# Patient Record
Sex: FEMALE | Race: WHITE | NOT HISPANIC OR LATINO | Employment: FULL TIME | URBAN - NONMETROPOLITAN AREA
[De-identification: names, ages, dates, MRNs, and addresses within clinical notes are randomized per-mention and may not be internally consistent; named-entity substitution may affect disease eponyms.]

---

## 2018-01-25 ENCOUNTER — HISTORICAL (OUTPATIENT)
Dept: ADMINISTRATIVE | Facility: HOSPITAL | Age: 33
End: 2018-01-25

## 2018-01-29 ENCOUNTER — HISTORICAL (OUTPATIENT)
Dept: INFUSION THERAPY | Facility: HOSPITAL | Age: 33
End: 2018-01-29

## 2018-02-26 ENCOUNTER — HISTORICAL (OUTPATIENT)
Dept: RADIOLOGY | Facility: HOSPITAL | Age: 33
End: 2018-02-26

## 2022-01-27 LAB
BASOPHILS # BLD AUTO: 0.07 10*3/UL (ref 0.01–0.08)
BASOPHILS NFR BLD AUTO: 0.8 % (ref 0.1–1.2)
EOSINOPHIL # BLD AUTO: 0.23 10*3/UL (ref 0.04–0.36)
EOSINOPHIL NFR BLD AUTO: 2.6 % (ref 0.7–7)
ERYTHROCYTE [DISTWIDTH] IN BLOOD BY AUTOMATED COUNT: 23.8 % (ref 11–14.5)
EST. AVERAGE GLUCOSE BLD GHB EST-MCNC: 60 MG/DL (ref 70–115)
HBA1C MFR BLD: <4 % (ref 4–6)
HCT VFR BLD AUTO: 27.6 % (ref 36–48)
HGB BLD-MCNC: 9.6 G/DL (ref 11.8–16)
IMM GRANULOCYTES # BLD AUTO: 0.1 10*3/UL (ref 0–0.03)
IMM GRANULOCYTES NFR BLD AUTO: 1.1 % (ref 0–0.5)
IRON SERPL-MCNC: 70 UG/DL (ref 37–170)
LYMPHOCYTES # BLD AUTO: 1.98 10*3/UL (ref 1.16–3.74)
LYMPHOCYTES NFR BLD AUTO: 22.4 % (ref 20–55)
MANUAL DIFF? (OHS): NORMAL
MCH RBC QN AUTO: 31.3 PG (ref 27–34)
MCHC RBC AUTO-ENTMCNC: 34.8 G/DL (ref 31–37)
MCV RBC AUTO: 89.9 FL (ref 79–99)
MONOCYTES # BLD AUTO: 0.53 10*3/UL (ref 0.24–0.36)
MONOCYTES NFR BLD AUTO: 6 % (ref 4.7–12.5)
NEUTROPHILS # BLD AUTO: 5.94 10*3/UL (ref 1.56–6.13)
NEUTROPHILS NFR BLD AUTO: 67.1 % (ref 37–73)
PLATELET # BLD AUTO: 203 10*3/UL (ref 140–371)
PMV BLD AUTO: 10.4 FL (ref 9.4–12.4)
RBC # BLD AUTO: 3.07 10*6/UL (ref 4–5.1)
WBC # SPEC AUTO: 8.9 10*3/UL (ref 4–11.5)

## 2022-04-07 ENCOUNTER — HISTORICAL (OUTPATIENT)
Dept: ADMINISTRATIVE | Facility: HOSPITAL | Age: 37
End: 2022-04-07

## 2022-04-24 VITALS
WEIGHT: 194.44 LBS | OXYGEN SATURATION: 99 % | BODY MASS INDEX: 34.45 KG/M2 | DIASTOLIC BLOOD PRESSURE: 76 MMHG | SYSTOLIC BLOOD PRESSURE: 128 MMHG | HEIGHT: 63 IN

## 2022-05-14 ENCOUNTER — HISTORICAL (OUTPATIENT)
Dept: ADMINISTRATIVE | Facility: HOSPITAL | Age: 37
End: 2022-05-14

## 2022-09-09 ENCOUNTER — HOSPITAL ENCOUNTER (EMERGENCY)
Facility: HOSPITAL | Age: 37
Discharge: HOME | End: 2022-09-09
Attending: EMERGENCY MEDICINE
Payer: MEDICAID

## 2022-09-09 VITALS
HEART RATE: 96 BPM | DIASTOLIC BLOOD PRESSURE: 74 MMHG | BODY MASS INDEX: 31.89 KG/M2 | SYSTOLIC BLOOD PRESSURE: 127 MMHG | HEIGHT: 63 IN | RESPIRATION RATE: 19 BRPM | TEMPERATURE: 98 F | WEIGHT: 180 LBS | OXYGEN SATURATION: 98 %

## 2022-09-09 DIAGNOSIS — R07.9 CHEST PAIN: ICD-10-CM

## 2022-09-09 DIAGNOSIS — R07.81 CHEST PAIN, PLEURITIC: Primary | ICD-10-CM

## 2022-09-09 LAB
ALBUMIN SERPL-MCNC: 4.6 GM/DL (ref 3.5–5)
ALBUMIN/GLOB SERPL: 1.7 RATIO (ref 1.1–2)
ALP SERPL-CCNC: 58 UNIT/L (ref 40–150)
ALT SERPL-CCNC: 39 UNIT/L (ref 0–55)
AST SERPL-CCNC: 23 UNIT/L (ref 5–34)
BASOPHILS # BLD AUTO: 0.06 X10(3)/MCL (ref 0–0.2)
BASOPHILS NFR BLD AUTO: 0.6 %
BILIRUBIN DIRECT+TOT PNL SERPL-MCNC: 3.5 MG/DL
BUN SERPL-MCNC: 12 MG/DL (ref 7–18.7)
CALCIUM SERPL-MCNC: 10.1 MG/DL (ref 8.4–10.2)
CHLORIDE SERPL-SCNC: 103 MMOL/L (ref 98–107)
CO2 SERPL-SCNC: 20 MMOL/L (ref 22–29)
CREAT SERPL-MCNC: 0.53 MG/DL (ref 0.55–1.02)
D DIMER PPP IA.FEU-MCNC: <0.27 UG/ML FEU (ref 0–0.5)
EOSINOPHIL # BLD AUTO: 0.22 X10(3)/MCL (ref 0–0.9)
EOSINOPHIL NFR BLD AUTO: 2.1 %
ERYTHROCYTE [DISTWIDTH] IN BLOOD BY AUTOMATED COUNT: 23 % (ref 11.5–17)
GFR SERPLBLD CREATININE-BSD FMLA CKD-EPI: >60 MLS/MIN/1.73/M2
GLOBULIN SER-MCNC: 2.7 GM/DL (ref 2.4–3.5)
GLUCOSE SERPL-MCNC: 172 MG/DL (ref 74–100)
HCT VFR BLD AUTO: 26.5 % (ref 37–47)
HGB BLD-MCNC: 9 GM/DL (ref 12–16)
IMM GRANULOCYTES # BLD AUTO: 0.12 X10(3)/MCL (ref 0–0.04)
IMM GRANULOCYTES NFR BLD AUTO: 1.1 %
LYMPHOCYTES # BLD AUTO: 2.18 X10(3)/MCL (ref 0.6–4.6)
LYMPHOCYTES NFR BLD AUTO: 20.4 %
MCH RBC QN AUTO: 32 PG (ref 27–31)
MCHC RBC AUTO-ENTMCNC: 34 MG/DL (ref 33–36)
MCV RBC AUTO: 94.3 FL (ref 80–94)
MONOCYTES # BLD AUTO: 0.8 X10(3)/MCL (ref 0.1–1.3)
MONOCYTES NFR BLD AUTO: 7.5 %
NEUTROPHILS # BLD AUTO: 7.3 X10(3)/MCL (ref 2.1–9.2)
NEUTROPHILS NFR BLD AUTO: 68.3 %
PLATELET # BLD AUTO: 186 X10(3)/MCL (ref 130–400)
PMV BLD AUTO: 10.1 FL (ref 7.4–10.4)
POTASSIUM SERPL-SCNC: 4 MMOL/L (ref 3.5–5.1)
PROT SERPL-MCNC: 7.3 GM/DL (ref 6.4–8.3)
RBC # BLD AUTO: 2.81 X10(6)/MCL (ref 4.2–5.4)
SODIUM SERPL-SCNC: 138 MMOL/L (ref 136–145)
TROPONIN I SERPL-MCNC: 0.01 NG/ML (ref 0–0.04)
TROPONIN I SERPL-MCNC: <0.01 NG/ML (ref 0–0.04)
WBC # SPEC AUTO: 10.7 X10(3)/MCL (ref 4.5–11.5)

## 2022-09-09 PROCEDURE — 80053 COMPREHEN METABOLIC PANEL: CPT | Performed by: EMERGENCY MEDICINE

## 2022-09-09 PROCEDURE — 85379 FIBRIN DEGRADATION QUANT: CPT | Performed by: EMERGENCY MEDICINE

## 2022-09-09 PROCEDURE — 85025 COMPLETE CBC W/AUTO DIFF WBC: CPT | Performed by: EMERGENCY MEDICINE

## 2022-09-09 PROCEDURE — 36415 COLL VENOUS BLD VENIPUNCTURE: CPT | Performed by: EMERGENCY MEDICINE

## 2022-09-09 PROCEDURE — 63600175 PHARM REV CODE 636 W HCPCS: Performed by: EMERGENCY MEDICINE

## 2022-09-09 PROCEDURE — 84484 ASSAY OF TROPONIN QUANT: CPT | Performed by: EMERGENCY MEDICINE

## 2022-09-09 PROCEDURE — 93005 ELECTROCARDIOGRAM TRACING: CPT

## 2022-09-09 PROCEDURE — 99285 EMERGENCY DEPT VISIT HI MDM: CPT | Mod: 25

## 2022-09-09 PROCEDURE — 96374 THER/PROPH/DIAG INJ IV PUSH: CPT

## 2022-09-09 PROCEDURE — 96375 TX/PRO/DX INJ NEW DRUG ADDON: CPT

## 2022-09-09 RX ORDER — LORAZEPAM 2 MG/ML
1 INJECTION INTRAMUSCULAR
Status: COMPLETED | OUTPATIENT
Start: 2022-09-09 | End: 2022-09-09

## 2022-09-09 RX ORDER — KETOROLAC TROMETHAMINE 30 MG/ML
30 INJECTION, SOLUTION INTRAMUSCULAR; INTRAVENOUS
Status: COMPLETED | OUTPATIENT
Start: 2022-09-09 | End: 2022-09-09

## 2022-09-09 RX ADMIN — LORAZEPAM 1 MG: 2 INJECTION INTRAMUSCULAR; INTRAVENOUS at 10:09

## 2022-09-09 RX ADMIN — KETOROLAC TROMETHAMINE 30 MG: 30 INJECTION, SOLUTION INTRAMUSCULAR at 10:09

## 2022-09-09 NOTE — DISCHARGE INSTRUCTIONS
Your workup today shows no concerning findings other than hemoglobin of 9.0.  Her last on file was 9.6.  Follow-up with family doctor next week to let them know your in the emergency department.    In regards to your workup your heart markers were negative for any heart attack and the test to evaluate you for any blood clots was also negative.    Please take 400-600 mg ibuprofen every 6-8 hours for the next 5 days to help with your symptoms.    Any worsening of symptoms or concerns please seek medical re-evaluation promptly

## 2022-09-09 NOTE — ED PROVIDER NOTES
Encounter Date: 2022       History     Chief Complaint   Patient presents with    Chest Pain     C/o left sided chest/breast pain that started last night and is worse this morning     CP that started last night. Sharp in nature. No recent fevers or illnesses. No N/V/D. No hx of MI or stroke.    Review of patient's allergies indicates:  No Known Allergies  Past Medical History:   Diagnosis Date    Anxiety disorder, unspecified     Depression     Neuropathy      Past Surgical History:   Procedure Laterality Date     SECTION      CHOLECYSTECTOMY       History reviewed. No pertinent family history.  Social History     Tobacco Use    Smoking status: Former     Types: Cigarettes    Smokeless tobacco: Never   Substance Use Topics    Alcohol use: Yes     Comment: occasion    Drug use: Never     Review of Systems   Constitutional:  Negative for fever.   HENT:  Negative for sore throat.    Respiratory:  Positive for chest tightness. Negative for shortness of breath.    Cardiovascular:  Negative for chest pain.   Gastrointestinal:  Negative for nausea.   Genitourinary:  Negative for dysuria.   Musculoskeletal:  Negative for back pain.   Skin:  Negative for rash.   Neurological:  Negative for weakness.   Hematological:  Does not bruise/bleed easily.     Physical Exam     Initial Vitals [22 0901]   BP Pulse Resp Temp SpO2   (!) 160/95 100 (!) 24 98.2 °F (36.8 °C) 100 %      MAP       --         Physical Exam    Nursing note reviewed.  Constitutional: She appears well-developed and well-nourished. No distress.   HENT:   Head: Normocephalic and atraumatic.   Eyes: EOM are normal. Pupils are equal, round, and reactive to light.   Neck:   Normal range of motion.  Cardiovascular:  Normal rate and regular rhythm.           Pulmonary/Chest: No stridor. No respiratory distress. She has no wheezes.   Abdominal: Abdomen is soft. Bowel sounds are normal. She exhibits no distension.   Musculoskeletal:         General:  Normal range of motion.      Cervical back: Normal range of motion.     Psychiatric: She has a normal mood and affect.       ED Course   Procedures  Labs Reviewed   COMPREHENSIVE METABOLIC PANEL - Abnormal; Notable for the following components:       Result Value    Carbon Dioxide 20 (*)     Glucose Level 172 (*)     Creatinine 0.53 (*)     Bilirubin Total 3.5 (*)     All other components within normal limits   CBC WITH DIFFERENTIAL - Abnormal; Notable for the following components:    RBC 2.81 (*)     Hgb 9.0 (*)     Hct 26.5 (*)     MCV 94.3 (*)     MCH 32.0 (*)     RDW 23.0 (*)     IG# 0.12 (*)     All other components within normal limits   TROPONIN I - Normal   D DIMER, QUANTITATIVE - Normal   TROPONIN I - Normal   CBC W/ AUTO DIFFERENTIAL    Narrative:     The following orders were created for panel order CBC auto differential.  Procedure                               Abnormality         Status                     ---------                               -----------         ------                     CBC with Differential[[id number]]        Abnormal            Final result                 Please view results for these tests on the individual orders.        ECG Results              EKG 12-lead (In process)  Result time 09/09/22 13:07:43      In process by Interface, Lab In [facility] (09/09/22 13:07:43)                   Narrative:    Test Reason : R07.9,    Vent. Rate : 102 BPM     Atrial Rate : 102 BPM     P-R Int : 130 ms          QRS Dur : 070 ms      QT Int : 340 ms       P-R-T Axes : 063 054 044 degrees     QTc Int : 443 ms    Sinus tachycardia  Nonspecific ST and T wave abnormality  Abnormal ECG  No previous ECGs available    Referred By: [email or web address]   SELF           Confirmed By:                                   Imaging Results              X-Ray Chest AP Portable (Final result)  Result time 09/09/22 11:53:27      Final result by Mary Paris MD (09/09/22 11:53:27)                   Impression:      No  acute abnormality of the chest.      Electronically signed by: Mary Paris  Date:    09/09/2022  Time:    11:53               Narrative:    EXAMINATION:  XR CHEST AP PORTABLE    CLINICAL HISTORY:  Chest pain, unspecified    COMPARISON:  None    FINDINGS:  The heart is prominent in size.  The lungs are clear without focal consolidation.  There is no pleural effusion or visible pneumothorax.                                       Medications   ketorolac injection 30 mg (30 mg Intravenous Given 9/9/22 1029)   lorazepam injection 1 mg (1 mg Intravenous Given 9/9/22 1000)     Medical Decision Making:   Clinical Tests:   Lab Tests: Reviewed  Radiological Study: Reviewed  ED Management:  Patient's symptoms improved with Toradol.  D-dimer negative chest x-ray shows no acute abnormalities.  Labs normal limits without significant other hemoglobin 9 patient has a history of anemia with last on record 9.6.  Patient does have a family doctor advised to follow up next week to let us know the emergency department in consideration of iron supplementation further workup for anemia.  Patient states agrees plan of care.  Return precautions provided             ED Course as of 09/09/22 1419   Fri Sep 09, 2022   1101 Hemoglobin(!): 9.0 [DL]      ED Course User Index  [DL] Francis Kelsey MD             Clinical Impression:   Final diagnoses:  [R07.9] Chest pain  [R07.81] Chest pain, pleuritic (Primary)        ED Disposition Condition    Discharge Stable          ED Prescriptions    None       Follow-up Information       Follow up With Specialties Details Why Contact Info    Mark Luong NP Family Medicine In 3 days  [address]  [address]  [facility] [id number]  [phone]      [facility] - Emergency Dept Emergency Medicine  As needed, If symptoms worsen [address] [facility] [id number] [phone]             Francis Kelsey MD  09/09/22 1419       Francis Kelsey,  MD  09/30/22 0627

## 2023-01-04 VITALS
SYSTOLIC BLOOD PRESSURE: 136 MMHG | TEMPERATURE: 98 F | HEIGHT: 63 IN | BODY MASS INDEX: 35.16 KG/M2 | WEIGHT: 198.44 LBS | DIASTOLIC BLOOD PRESSURE: 82 MMHG | HEART RATE: 99 BPM | OXYGEN SATURATION: 98 %

## 2023-01-04 DIAGNOSIS — L30.9 ECZEMA, UNSPECIFIED TYPE: Primary | ICD-10-CM

## 2023-01-04 DIAGNOSIS — D58.0 HEREDITARY SPHEROCYTOSIS: ICD-10-CM

## 2023-01-04 PROBLEM — Z86.16 HISTORY OF SEVERE ACUTE RESPIRATORY SYNDROME CORONAVIRUS 2 (SARS-COV-2) DISEASE: Status: ACTIVE | Noted: 2023-01-04

## 2023-01-04 PROBLEM — R73.9 HYPERGLYCEMIA: Status: ACTIVE | Noted: 2023-01-04

## 2023-01-04 PROBLEM — G57.90 NEUROPATHY OF LOWER EXTREMITY: Status: ACTIVE | Noted: 2023-01-04

## 2023-01-04 PROBLEM — F41.9 ANXIETY DISORDER: Status: ACTIVE | Noted: 2018-01-18

## 2023-01-04 PROBLEM — E66.9 OBESITY WITH BODY MASS INDEX 30 OR GREATER: Status: ACTIVE | Noted: 2023-01-04

## 2023-01-04 PROBLEM — E80.6 HYPERBILIRUBINEMIA: Status: ACTIVE | Noted: 2017-11-17

## 2023-01-04 RX ORDER — BUSPIRONE HYDROCHLORIDE 5 MG/1
5 TABLET ORAL
COMMUNITY
Start: 2022-04-07 | End: 2023-02-01

## 2023-01-04 RX ORDER — GABAPENTIN 300 MG/1
300 CAPSULE ORAL
COMMUNITY
Start: 2022-04-07 | End: 2023-04-13 | Stop reason: SDUPTHER

## 2023-01-04 RX ORDER — DULOXETIN HYDROCHLORIDE 60 MG/1
120 CAPSULE, DELAYED RELEASE ORAL
COMMUNITY
Start: 2022-04-07 | End: 2023-02-01 | Stop reason: SDUPTHER

## 2023-01-04 RX ORDER — ALBUTEROL SULFATE 90 UG/1
2 AEROSOL, METERED RESPIRATORY (INHALATION) EVERY 4 HOURS PRN
COMMUNITY

## 2023-01-04 RX ORDER — GABAPENTIN 100 MG/1
CAPSULE ORAL
COMMUNITY
Start: 2022-08-29 | End: 2023-02-01

## 2023-01-04 RX ORDER — CRISABOROLE 20 MG/G
OINTMENT TOPICAL 2 TIMES DAILY
COMMUNITY

## 2023-01-14 VITALS
TEMPERATURE: 98 F | OXYGEN SATURATION: 98 % | HEIGHT: 63 IN | SYSTOLIC BLOOD PRESSURE: 136 MMHG | HEART RATE: 99 BPM | BODY MASS INDEX: 35.16 KG/M2 | WEIGHT: 198.44 LBS | DIASTOLIC BLOOD PRESSURE: 82 MMHG

## 2023-01-14 RX ORDER — CRISABOROLE 20 MG/G
OINTMENT TOPICAL 2 TIMES DAILY
COMMUNITY
End: 2023-02-01

## 2023-01-14 RX ORDER — ALBUTEROL SULFATE 90 UG/1
2 AEROSOL, METERED RESPIRATORY (INHALATION) EVERY 6 HOURS PRN
COMMUNITY
End: 2023-04-13 | Stop reason: SDUPTHER

## 2023-01-25 PROCEDURE — 80053 COMPREHEN METABOLIC PANEL: CPT | Performed by: NURSE PRACTITIONER

## 2023-01-25 PROCEDURE — 83540 ASSAY OF IRON: CPT | Performed by: NURSE PRACTITIONER

## 2023-01-25 PROCEDURE — 85025 COMPLETE CBC W/AUTO DIFF WBC: CPT | Performed by: NURSE PRACTITIONER

## 2023-01-25 PROCEDURE — 83550 IRON BINDING TEST: CPT | Performed by: NURSE PRACTITIONER

## 2023-01-25 PROCEDURE — 82728 ASSAY OF FERRITIN: CPT | Performed by: NURSE PRACTITIONER

## 2023-01-25 PROCEDURE — 84443 ASSAY THYROID STIM HORMONE: CPT | Performed by: NURSE PRACTITIONER

## 2023-01-25 PROCEDURE — 83036 HEMOGLOBIN GLYCOSYLATED A1C: CPT | Performed by: NURSE PRACTITIONER

## 2023-01-25 PROCEDURE — 80061 LIPID PANEL: CPT | Performed by: NURSE PRACTITIONER

## 2023-01-31 PROBLEM — F41.9 ANXIETY: Status: ACTIVE | Noted: 2017-11-07

## 2023-01-31 PROBLEM — D58.0 HEREDITARY SPHEROCYTOSIS: Status: ACTIVE | Noted: 2023-01-31

## 2023-01-31 PROBLEM — L30.9 ECZEMA: Status: ACTIVE | Noted: 2023-01-31

## 2023-01-31 PROBLEM — Z00.01 ABNORMAL PHYSICAL EVALUATION: Status: ACTIVE | Noted: 2023-01-31

## 2023-01-31 PROBLEM — F32.A DEPRESSIVE DISORDER: Status: ACTIVE | Noted: 2017-11-17

## 2023-02-01 ENCOUNTER — OFFICE VISIT (OUTPATIENT)
Dept: FAMILY MEDICINE | Facility: CLINIC | Age: 38
End: 2023-02-01
Payer: MEDICAID

## 2023-02-01 VITALS
HEART RATE: 100 BPM | SYSTOLIC BLOOD PRESSURE: 128 MMHG | HEIGHT: 63 IN | DIASTOLIC BLOOD PRESSURE: 80 MMHG | OXYGEN SATURATION: 98 % | BODY MASS INDEX: 36.29 KG/M2 | TEMPERATURE: 98 F | WEIGHT: 204.81 LBS

## 2023-02-01 DIAGNOSIS — Z00.01 ABNORMAL PHYSICAL EVALUATION: Primary | ICD-10-CM

## 2023-02-01 DIAGNOSIS — Z28.21 INFLUENZA VACCINATION DECLINED BY PATIENT: ICD-10-CM

## 2023-02-01 DIAGNOSIS — E66.9 OBESITY WITH BODY MASS INDEX 30 OR GREATER: ICD-10-CM

## 2023-02-01 DIAGNOSIS — F32.A DEPRESSIVE DISORDER: ICD-10-CM

## 2023-02-01 DIAGNOSIS — G57.93 NEUROPATHY INVOLVING BOTH LOWER EXTREMITIES: ICD-10-CM

## 2023-02-01 DIAGNOSIS — D58.0 HEREDITARY SPHEROCYTOSIS: ICD-10-CM

## 2023-02-01 DIAGNOSIS — D64.9 ANEMIA, UNSPECIFIED TYPE: ICD-10-CM

## 2023-02-01 PROCEDURE — 1159F PR MEDICATION LIST DOCUMENTED IN MEDICAL RECORD: ICD-10-PCS | Mod: CPTII,,, | Performed by: NURSE PRACTITIONER

## 2023-02-01 PROCEDURE — 3008F PR BODY MASS INDEX (BMI) DOCUMENTED: ICD-10-PCS | Mod: CPTII,,, | Performed by: NURSE PRACTITIONER

## 2023-02-01 PROCEDURE — 3074F PR MOST RECENT SYSTOLIC BLOOD PRESSURE < 130 MM HG: ICD-10-PCS | Mod: CPTII,,, | Performed by: NURSE PRACTITIONER

## 2023-02-01 PROCEDURE — 3074F SYST BP LT 130 MM HG: CPT | Mod: CPTII,,, | Performed by: NURSE PRACTITIONER

## 2023-02-01 PROCEDURE — 3079F DIAST BP 80-89 MM HG: CPT | Mod: CPTII,,, | Performed by: NURSE PRACTITIONER

## 2023-02-01 PROCEDURE — 3008F BODY MASS INDEX DOCD: CPT | Mod: CPTII,,, | Performed by: NURSE PRACTITIONER

## 2023-02-01 PROCEDURE — 99395 PR PREVENTIVE VISIT,EST,18-39: ICD-10-PCS | Mod: ,,, | Performed by: NURSE PRACTITIONER

## 2023-02-01 PROCEDURE — 1160F PR REVIEW ALL MEDS BY PRESCRIBER/CLIN PHARMACIST DOCUMENTED: ICD-10-PCS | Mod: CPTII,,, | Performed by: NURSE PRACTITIONER

## 2023-02-01 PROCEDURE — 1159F MED LIST DOCD IN RCRD: CPT | Mod: CPTII,,, | Performed by: NURSE PRACTITIONER

## 2023-02-01 PROCEDURE — 1160F RVW MEDS BY RX/DR IN RCRD: CPT | Mod: CPTII,,, | Performed by: NURSE PRACTITIONER

## 2023-02-01 PROCEDURE — 99395 PREV VISIT EST AGE 18-39: CPT | Mod: ,,, | Performed by: NURSE PRACTITIONER

## 2023-02-01 PROCEDURE — 3079F PR MOST RECENT DIASTOLIC BLOOD PRESSURE 80-89 MM HG: ICD-10-PCS | Mod: CPTII,,, | Performed by: NURSE PRACTITIONER

## 2023-02-01 RX ORDER — DULOXETIN HYDROCHLORIDE 60 MG/1
120 CAPSULE, DELAYED RELEASE ORAL DAILY
Qty: 60 CAPSULE | Refills: 11 | Status: SHIPPED | OUTPATIENT
Start: 2023-02-01 | End: 2024-01-21 | Stop reason: SDUPTHER

## 2023-02-01 RX ORDER — FLUTICASONE PROPIONATE 50 MCG
50 SPRAY, SUSPENSION (ML) NASAL 2 TIMES DAILY PRN
COMMUNITY
End: 2023-02-01

## 2023-02-01 NOTE — PROGRESS NOTES
Patient ID: Carrie Lejeune  MRN: [id number]    Chief Complaint: Annual Exam      Allergies: Patient has No Known Allergies.     Smoking status:  Social History     Tobacco Use   Smoking Status Never   Smokeless Tobacco Never       Problem List:  Patient Active Problem List   Diagnosis    Anemia    Neuropathy of lower extremity    Anxiety    Hyperbilirubinemia    Hyperglycemia    Obesity with body mass index 30 or greater    History of severe acute respiratory syndrome coronavirus 2 (SARS-CoV-2) disease    Abnormal physical evaluation    Depressive disorder    Eczema    Hereditary spherocytosis        Past Medical History:  has a past medical history of Anemia, Anxiety disorder, unspecified, BMI 34.0-34.9,adult, BMI 35.0-35.9,adult, Depression, DM (diabetes mellitus), gestational, Eczema, Elevated fasting blood sugar, Headache, Hereditary spherocytosis, Hyperbilirubinemia, Leukocytosis, Neuropathy, Personal history of COVID-19, Scleral icterus, Screening for cholesterol level, Screening for diabetes mellitus (DM), Screening for thyroid disorder, and Spherocytosis.    Surgical History:  has a past surgical history that includes Cholecystectomy (2006);  section; Tonsillectomy; and ovary operation (2017).    Family History: family history includes No Known Problems in her brother, father, mother, and sister.    Social History:  reports that she has never smoked. She has never used smokeless tobacco. She reports that she does not drink alcohol and does not use drugs.    Care Team: Patient Care Team:  MILENA Yin as PCP - General (Family Medicine)  Rashel Su as Consulting Physician (Obstetrics and Gynecology)  [facility] (Optometry)  Alexy Murray MD as Consulting Physician (Neurology)     Current Medications:  Current Outpatient Medications   Medication Instructions    albuterol (PROVENTIL/VENTOLIN HFA) 90 mcg/actuation inhaler 2 puffs, Inhalation, Every 4 hours PRN, Rescue    albuterol  (PROVENTIL/VENTOLIN HFA) 90 mcg/actuation inhaler 2 puffs, Inhalation, Every 6 hours PRN, Rescue    crisaborole (EUCRISA) 2 % Oint Topical (Top), 2 times daily    DULoxetine (CYMBALTA) 120 mg, Oral, Daily    fluticasone propionate (FLONASE) 50 mcg/actuation nasal spray 50 sprays, Each Nostril, 2 times daily PRN    gabapentin (NEURONTIN) 300 mg, Oral       History of Present Illness:  The patient is a 37 y.o. White female who presents to clinic for annual wellness visit.    Diet and nutrition:  Diet is high in salt, high in fat, low in fiber, high caloric intake, high carbohydrate meals, high calcium intake.    Fracture wrist: No history of fracture, no recent unexplained fracture.    Physical activity:  Does not exercise on a regular basis, good physical condition.    Depression risks:  + history of depression, never feel sad, empty, or tearful, no sleep disturbances, no agitation, no loss of energy, no feelings of worthlessness or guilt, no thoughts of suicide.    Orientation:  No disorientation to time, no disorientation to place.    Concentration and memory:  No decreased concentration ability, no memory lapses or loss, does not forget words.    Speech forward/motor difficulties: No speech difficulties, no difficulty expressing formulated concepts, no difficulty with fine manipulative tasks, no difficulty writing forward/copying, no slowed reaction time, does not knock things over when trying to pick them up.    Fall risk assessment:  No frequent falls while walking, no fall in the past year, no dizziness forward/vertigo, no fear of falling.  Hearing:  No loss of hearing, does not wear hearing aids.    Vision:  No vision problems, does wear glasses.    Activity of daily living: Able to bathe with limited or no assistance, able to control urination and bowels, able to dress with limited or no assistance, able to feed self with limited or no assistance, able to get out of chair or bed with limited or no assistance,  able to [place] with limited or no assistance, able to toilet with limited are no assistance.    Activities of daily living:  Able to do housework with limited or no assistance, able to grocery shop with limited or no assistance, able to manage medications with limited or no assistance, able to manage money with limited or no assistance, able to prepare meals with limited or no assistance, able to use the phone with limited or no assistance.    Screenings: due for vaccinations, not due for breast cancer screening, due for cervical cancer screening, not due for colorectal cancer screening.  [facility]. Patient reports mood stable with current dose of Cymbalta, denies SI/HI.          Review of Systems   Constitutional:  Negative for appetite change, fatigue, fever and unexpected weight change.   HENT:  Negative for nasal congestion, ear pain, facial swelling, hearing loss, mouth sores, nosebleeds, sore throat and trouble swallowing.    Eyes:  Negative for pain, discharge, redness and visual disturbance.   Respiratory:  Negative for cough, chest tightness and shortness of breath.    Cardiovascular:  Negative for chest pain, palpitations and leg swelling.   Gastrointestinal:  Negative for abdominal pain, blood in stool, constipation, diarrhea and nausea.   Endocrine: Negative for cold intolerance, heat intolerance, polydipsia, polyphagia and polyuria.   Genitourinary:  Negative for difficulty urinating, dysuria, frequency and hematuria.   Musculoskeletal:  Positive for arthralgias and myalgias. Negative for joint swelling and joint deformity.   Integumentary:  Negative for color change, rash and mole/lesion.   Neurological:  Positive for numbness. Negative for dizziness, weakness, headaches and memory loss.   Hematological:  Negative for adenopathy. Does not bruise/bleed easily.   Psychiatric/Behavioral:  Negative for confusion, sleep disturbance and suicidal ideas.    "    Visit Vitals  /80 (BP Location: Right arm)   Pulse 100   Temp 97.9 °F (36.6 °C) (Temporal)   Ht 5' 2.99" (1.6 m)   Wt 92.9 kg (204 lb 12.9 oz)   LMP  (LMP Unknown)   SpO2 98%   BMI 36.29 kg/m²       Physical Exam  Vitals reviewed.   Constitutional:       General: She is not in acute distress.     Appearance: Normal appearance.   HENT:      Head: Normocephalic and atraumatic.      Right Ear: Tympanic membrane, ear canal and external ear normal.      Left Ear: Tympanic membrane, ear canal and external ear normal.      Nose: Nose normal. No congestion.      Mouth/Throat:      Mouth: Mucous membranes are moist.      Pharynx: Oropharynx is clear. No oropharyngeal exudate or posterior oropharyngeal erythema.   Eyes:      Extraocular Movements: Extraocular movements intact.      Conjunctiva/sclera: Conjunctivae normal.      Pupils: Pupils are equal, round, and reactive to light.   Cardiovascular:      Rate and Rhythm: Normal rate and regular rhythm.      Pulses: Normal pulses.      Heart sounds: No murmur heard.  Pulmonary:      Effort: Pulmonary effort is normal.      Breath sounds: Normal breath sounds.   Abdominal:      General: Bowel sounds are normal.      Palpations: Abdomen is soft.      Tenderness: There is no abdominal tenderness.   Musculoskeletal:         General: No swelling, tenderness or deformity. Normal range of motion.      Cervical back: Normal range of motion and neck supple.   Lymphadenopathy:      Cervical: No cervical adenopathy.   Skin:     General: Skin is warm and dry.      Capillary Refill: Capillary refill takes less than 2 seconds.      Coloration: Skin is not jaundiced.      Findings: No rash.   Neurological:      Mental Status: She is alert and oriented to person, place, and time.      Cranial Nerves: No cranial nerve deficit.   Psychiatric:         Mood and Affect: Mood normal.         Behavior: Behavior normal.        Labs Reviewed:  Lab Visit on 01/25/2023   Component Date Value " Ref Range Status    Sodium Level 01/25/2023 138  135 - 145 mmol/L Final    Potassium Level 01/25/2023 4.5  3.5 - 5.1 mmol/L Final    Chloride 01/25/2023 102  98 - 110 mmol/L Final    Carbon Dioxide 01/25/2023 29  21 - 32 mmol/L Final    Glucose Level 01/25/2023 171 (H)  70 - 115 mg/dL Final    Blood Urea Nitrogen 01/25/2023 13.0  7.0 - 20.0 mg/dL Final    Creatinine 01/25/2023 0.44 (L)  0.66 - 1.25 mg/dL Final    Calcium Level Total 01/25/2023 9.3  8.4 - 10.2 mg/dL Final    Protein Total 01/25/2023 6.9  6.3 - 8.2 gm/dL Final    Albumin Level 01/25/2023 4.6  3.4 - 5.0 g/dL Final    Globulin 01/25/2023 2.3  2.0 - 3.9 gm/dL Final    Albumin/Globulin Ratio 01/25/2023 2.0  ratio Final    Bilirubin Total 01/25/2023 3.1 (H)  0.0 - 1.0 mg/dL Final    Alkaline Phosphatase 01/25/2023 53  50 - 144 unit/L Final    Alanine Aminotransferase 01/25/2023 61 (H)  1 - 45 unit/L Final    Aspartate Aminotransferase 01/25/2023 48 (H)  14 - 36 unit/L Final    eGFR 01/25/2023 >90  mls/min/1.73/m2 Final    Hemoglobin A1c 01/25/2023 4.2  4.0 - 6.0 % Final    Estimated Average Glucose 01/25/2023 73.8  70.0 - 115.0 mg/dL Final    Cholesterol Total 01/25/2023 105  0 - 200 mg/dL Final    HDL Cholesterol 01/25/2023 46  40 - 60 mg/dL Final    Triglyceride 01/25/2023 118  30 - 200 mg/dL Final    LDL Cholesterol Direct 01/25/2023 51.3  30.0 - 100.0 mg/dL Final    Thyroid Stimulating Hormone 01/25/2023 1.340  0.360 - 3.740 uIU/mL Final    Iron Level 01/25/2023 99  37 - 170 ug/dL Final    Ferritin Level 01/25/2023 258  6.24 - 264.00 ng/mL Final    WBC 01/25/2023 8.2  4.0 - 11.5 x10(3)/mcL Final    RBC 01/25/2023 3.00 (L)  4.00 - 5.10 x10(6)/mcL Final    Hgb 01/25/2023 9.5 (L)  11.8 - 16.0 gm/dL Final    Hct 01/25/2023 28.1 (L)  36.0 - 48.0 % Final    MCV 01/25/2023 93.7  79.0 - 99.0 fL Final    MCH 01/25/2023 31.7  27.0 - 34.0 pg Final    MCHC 01/25/2023 33.8  31.0 - 37.0 g/dL Final    RDW 01/25/2023 23.7 (H)  11.0 - 14.5 % Final    Platelet  01/25/2023 179  140 - 371 x10(3)/mcL Final    MPV 01/25/2023 10.2  9.4 - 12.4 fL Final    Neut % 01/25/2023 67.2  37 - 73 % Final    Lymph % 01/25/2023 22.1  20 - 55 % Final    Mono % 01/25/2023 6.6  4.7 - 12.5 % Final    Eos % 01/25/2023 2.4  0.7 - 7 % Final    Basophil % 01/25/2023 0.6  0.1 - 1.2 % Final    Lymph # 01/25/2023 1.82  1.16 - 3.74 x10(3)/mcL Final    Neut # 01/25/2023 5.54  1.56 - 6.13 x10(3)/mcL Final    Mono # 01/25/2023 0.54 (H)  0.24 - 0.36 x10(3)/mcL Final    Eos # 01/25/2023 0.20  0.04 - 0.36 x10(3)/mcL Final    Baso # 01/25/2023 0.05  0.01 - 0.08 x10(3)/mcL Final    IG# 01/25/2023 0.09 (H)  0.0001 - 0.031 x10(3)/mcL Final    IG% 01/25/2023 1.1 (H)  0 - 0.5 % Final    NRBC% 01/25/2023 1.6 (H)  0 - 1 % Final    RBC Morph 01/25/2023 Abnormal (A)  Normal Final    Anisocyte 01/25/2023 1+ (A)  (none) Final    Poik 01/25/2023 1+ (A)  (none) Final    Platelet Est 01/25/2023 Normal  Normal, Adequate Final    Iron Binding Capacity Unsaturated 01/25/2023 211  70 - 310 ug/dL Final    Iron Level 01/25/2023 88  50 - 170 ug/dL Final    Transferrin 01/25/2023 260  180 - 382 mg/dL Final    Iron Binding Capacity Total 01/25/2023 299  250 - 450 ug/dL Final    Iron Saturation 01/25/2023 29  20 - 50 % Final     Lab Visit on 01/25/2023   Component Date Value Ref Range Status    Sodium Level 01/25/2023 138  135 - 145 mmol/L Final    Potassium Level 01/25/2023 4.5  3.5 - 5.1 mmol/L Final    Chloride 01/25/2023 102  98 - 110 mmol/L Final    Carbon Dioxide 01/25/2023 29  21 - 32 mmol/L Final    Glucose Level 01/25/2023 171 (H)  70 - 115 mg/dL Final    Blood Urea Nitrogen 01/25/2023 13.0  7.0 - 20.0 mg/dL Final    Creatinine 01/25/2023 0.44 (L)  0.66 - 1.25 mg/dL Final    Calcium Level Total 01/25/2023 9.3  8.4 - 10.2 mg/dL Final    Protein Total 01/25/2023 6.9  6.3 - 8.2 gm/dL Final    Albumin Level 01/25/2023 4.6  3.4 - 5.0 g/dL Final    Globulin 01/25/2023 2.3  2.0 - 3.9 gm/dL Final    Albumin/Globulin Ratio  01/25/2023 2.0  ratio Final    Bilirubin Total 01/25/2023 3.1 (H)  0.0 - 1.0 mg/dL Final    Alkaline Phosphatase 01/25/2023 53  50 - 144 unit/L Final    Alanine Aminotransferase 01/25/2023 61 (H)  1 - 45 unit/L Final    Aspartate Aminotransferase 01/25/2023 48 (H)  14 - 36 unit/L Final    eGFR 01/25/2023 >90  mls/min/1.73/m2 Final    Hemoglobin A1c 01/25/2023 4.2  4.0 - 6.0 % Final    Estimated Average Glucose 01/25/2023 73.8  70.0 - 115.0 mg/dL Final    Cholesterol Total 01/25/2023 105  0 - 200 mg/dL Final    HDL Cholesterol 01/25/2023 46  40 - 60 mg/dL Final    Triglyceride 01/25/2023 118  30 - 200 mg/dL Final    LDL Cholesterol Direct 01/25/2023 51.3  30.0 - 100.0 mg/dL Final    Thyroid Stimulating Hormone 01/25/2023 1.340  0.360 - 3.740 uIU/mL Final    Iron Level 01/25/2023 99  37 - 170 ug/dL Final    Ferritin Level 01/25/2023 258  6.24 - 264.00 ng/mL Final    WBC 01/25/2023 8.2  4.0 - 11.5 x10(3)/mcL Final    RBC 01/25/2023 3.00 (L)  4.00 - 5.10 x10(6)/mcL Final    Hgb 01/25/2023 9.5 (L)  11.8 - 16.0 gm/dL Final    Hct 01/25/2023 28.1 (L)  36.0 - 48.0 % Final    MCV 01/25/2023 93.7  79.0 - 99.0 fL Final    MCH 01/25/2023 31.7  27.0 - 34.0 pg Final    MCHC 01/25/2023 33.8  31.0 - 37.0 g/dL Final    RDW 01/25/2023 23.7 (H)  11.0 - 14.5 % Final    Platelet 01/25/2023 179  140 - 371 x10(3)/mcL Final    MPV 01/25/2023 10.2  9.4 - 12.4 fL Final    Neut % 01/25/2023 67.2  37 - 73 % Final    Lymph % 01/25/2023 22.1  20 - 55 % Final    Mono % 01/25/2023 6.6  4.7 - 12.5 % Final    Eos % 01/25/2023 2.4  0.7 - 7 % Final    Basophil % 01/25/2023 0.6  0.1 - 1.2 % Final    Lymph # 01/25/2023 1.82  1.16 - 3.74 x10(3)/mcL Final    Neut # 01/25/2023 5.54  1.56 - 6.13 x10(3)/mcL Final    Mono # 01/25/2023 0.54 (H)  0.24 - 0.36 x10(3)/mcL Final    Eos # 01/25/2023 0.20  0.04 - 0.36 x10(3)/mcL Final    Baso # 01/25/2023 0.05  0.01 - 0.08 x10(3)/mcL Final    IG# 01/25/2023 0.09 (H)  0.0001 - 0.031 x10(3)/mcL Final    IG% 01/25/2023  1.1 (H)  0 - 0.5 % Final    NRBC% 01/25/2023 1.6 (H)  0 - 1 % Final    RBC Morph 01/25/2023 Abnormal (A)  Normal Final    Anisocyte 01/25/2023 1+ (A)  (none) Final    Poik 01/25/2023 1+ (A)  (none) Final    Platelet Est 01/25/2023 Normal  Normal, Adequate Final    Iron Binding Capacity Unsaturated 01/25/2023 211  70 - 310 ug/dL Final    Iron Level 01/25/2023 88  50 - 170 ug/dL Final    Transferrin 01/25/2023 260  180 - 382 mg/dL Final    Iron Binding Capacity Total 01/25/2023 299  250 - 450 ug/dL Final    Iron Saturation 01/25/2023 29  20 - 50 % Final        Assessment & Plan:    ICD-10-CM ICD-9-CM   1. Obesity with body mass index 30 or greater  E66.9 278.00   2. Abnormal physical evaluation  Z00.01 796.4   3. Hereditary spherocytosis  D58.0 282.0   4. Anemia, unspecified type  D64.9 285.9   5. Influenza vaccination declined by patient  Z28.21 V64.06   6. Neuropathy involving both lower extremities  G57.93 356.9   7. Depressive disorder  F32.A 311        1. Obesity with body mass index 30 or greater  Assessment & Plan:  Patient educated on importance of diet and exercise.  Weight loss goals discussed. Recommended 30-45 minutes of exercise five days a week.  In addition, counseled patient on importance of low fat diet, limiting carbohydrate intake, and increasing protein and vegetable intake. Hand outs provided. All questions answered.       2. Abnormal physical evaluation  Overview:  [facility]abl[facility] Su [facility]    Orders:  -     CBC Auto Differential; Future; Expected date: 02/01/2024  -     Comprehensive Metabolic Panel; Future; Expected date: 02/01/2024  -     Lipid Panel; Future; Expected date: 02/01/2024  -     TSH; Future; Expected date: 02/01/2024  -     Hemoglobin A1C; Future; Expected date: 02/01/2024    3. Hereditary spherocytosis  Overview:  Saw hematology in the past, recommended iron supplement. Follows up PRN      4. Anemia, unspecified  type  Overview:  01/21/2022 H&H 8.5 and 25, begin every-other-day iron supplement.  03/03/2022 H&H 9.6 and 27.6, continued q.o.d. iron      5. Influenza vaccination declined by patient    6. Neuropathy involving both lower extremities  Overview:  Established with Dr. Murray      7. Depressive disorder  Overview:  Taking cymbalta for depression and neuropathy      Other orders  -     DULoxetine (CYMBALTA) 60 MG capsule; Take 2 capsules (120 mg total) by mouth once daily.  Dispense: 60 capsule; Refill: 11         Cervical Cancer Screening-  Breast Cancer Screening-  Osteoporosis Screening-  Colon Cancer Screening -    Colon Cancer Screening-  Eye Exam- Recommend annually.  Dental Exam- Recommend biannually.  Vaccinations -   Immunization History   Administered Date(s) Administered    DTaP 1984, 1984, 01/29/1985, 03/11/1986, 06/02/1989    HIB 01/29/1988    Influenza 01/05/2017    Influenza - Trivalent - PF (ADULT) 01/05/2017    MMR 08/13/1985    PPD Test 06/09/1998, 08/30/2000    Poliovirus 1984, 1984, 08/13/1985, 06/02/1989    Tdap 02/19/2015       No future appointments.    Follow up in about 1 year (around 2/1/2024) for Wellness, Labs prior to apt. . Call sooner if needed.    MILENA BAUTISTA    Lab Frequency Next Occurrence   CBC Auto Differential Once 02/01/2024   Comprehensive Metabolic Panel Once 02/01/2024   Lipid Panel Once 02/01/2024   TSH Once 02/01/2024   Hemoglobin A1C Once 02/01/2024

## 2023-02-01 NOTE — ASSESSMENT & PLAN NOTE
Patient agrees to schedule her annual appointment with Dr. Su.  Patient declines influenza vaccination. Recommend biannual dental apt. Reviewed labs with patient.

## 2023-02-01 NOTE — ASSESSMENT & PLAN NOTE
Patient educated on importance of diet and exercise.  Weight loss goals discussed. Recommended 30-45 minutes of exercise five days a week.  In addition, counseled patient on importance of low fat diet, limiting carbohydrate intake, and increasing protein and vegetable intake. Hand outs provided. All questions answered.

## 2023-02-01 NOTE — PROGRESS NOTES
Patient ID: Carrie Lejeune  MRN: [id number]    Chief Complaint: Annual Exam    Allergies: Patient has No Known Allergies.     Past Medical History:  has a past medical history of Anemia, Anxiety disorder, unspecified, BMI 34.0-34.9,adult, BMI 35.0-35.9,adult, Depression, DM (diabetes mellitus), gestational, Eczema, Elevated fasting blood sugar, Headache, Hereditary spherocytosis, Hyperbilirubinemia, Leukocytosis, Neuropathy, Personal history of COVID-19, Scleral icterus, and Spherocytosis.    Surgical History:  has a past surgical history that includes Cholecystectomy (2006);  section; Tonsillectomy; and ovary operation (2017).    Family History: family history includes No Known Problems in her brother, father, mother, and sister.    Social History:  reports that she has never smoked. She has never used smokeless tobacco. She reports that she does not drink alcohol and does not use drugs.    Care Team: Patient Care Team:  MILENA Yin as PCP - General (Family Medicine)  Rashel Su as Consulting Physician (Obstetrics and Gynecology)  [facility] (Optometry)  Alexy Murray MD as Consulting Physician (Neurology)     Current Medications:  Current Outpatient Medications   Medication Instructions    albuterol (PROVENTIL/VENTOLIN HFA) 90 mcg/actuation inhaler 2 puffs, Inhalation, Every 4 hours PRN, Rescue    albuterol (PROVENTIL/VENTOLIN HFA) 90 mcg/actuation inhaler 2 puffs, Inhalation, Every 6 hours PRN, Rescue    crisaborole (EUCRISA) 2 % Oint Topical (Top), 2 times daily    DULoxetine (CYMBALTA) 120 mg, Oral, Daily    fluticasone propionate (FLONASE) 50 mcg/actuation nasal spray 50 sprays, Each Nostril, 2 times daily PRN    gabapentin (NEURONTIN) 300 mg, Oral       History of Present Illness:  The patient is a 37 y.o. White female who presents to clinic for annual wellness visit.    Diet and nutrition:  Diet is high in salt, high in fat, low in fiber, high caloric intake, high carbohydrate  meals, high calcium intake.    Fracture wrist: No history of fracture, no recent unexplained fracture.    Physical activity:  Does not exercise on a regular basis, good physical condition.    Depression risks:  + history of depression, never feel sad, empty, or tearful, no sleep disturbances, no agitation, no loss of energy, no feelings of worthlessness or guilt, no thoughts of suicide.    Orientation:  No disorientation to time, no disorientation to place.    Concentration and memory:  No decreased concentration ability, no memory lapses or loss, does not forget words.    Speech forward/motor difficulties: No speech difficulties, no difficulty expressing formulated concepts, no difficulty with fine manipulative tasks, no difficulty writing forward/copying, no slowed reaction time, does not knock things over when trying to pick them up.    Fall risk assessment:  No frequent falls while walking, no fall in the past year, no dizziness forward/vertigo, no fear of falling.  Hearing:  No loss of hearing, does not wear hearing aids.    Vision:  No vision problems, does wear glasses.    Activity of daily living: Able to bathe with limited or no assistance, able to control urination and bowels, able to dress with limited or no assistance, able to feed self with limited or no assistance, able to get out of chair or bed with limited or no assistance, able to [place] with limited or no assistance, able to toilet with limited are no assistance.    Activities of daily living:  Able to do housework with limited or no assistance, able to grocery shop with limited or no assistance, able to manage medications with limited or no assistance, able to manage money with limited or no assistance, able to prepare meals with limited or no assistance, able to use the phone with limited or no assistance.    Screenings: due for vaccinations, not due for breast cancer screening, due for cervical cancer screening, not due for colorectal cancer  "screening.  Patient is aware that she is past due for cervical cancer screening.  Patient agrees to schedule her appointment with Dr. Su.         Review of Systems   Constitutional:  Negative for appetite change, fatigue, fever and unexpected weight change.   HENT:  Negative for nasal congestion, ear pain, facial swelling, hearing loss, mouth sores, nosebleeds, sore throat and trouble swallowing.    Eyes:  Negative for pain, discharge, redness and visual disturbance.   Respiratory:  Negative for cough, chest tightness and shortness of breath.    Cardiovascular:  Negative for chest pain, palpitations and leg swelling.   Gastrointestinal:  Negative for abdominal pain, blood in stool, constipation, diarrhea and nausea.   Endocrine: Negative for cold intolerance, heat intolerance, polydipsia, polyphagia and polyuria.   Genitourinary:  Negative for difficulty urinating, dysuria, frequency and hematuria.   Musculoskeletal:  Positive for arthralgias. Negative for joint swelling and joint deformity.   Integumentary:  Negative for color change, rash and mole/lesion.   Neurological:  Positive for numbness. Negative for dizziness, weakness, headaches and memory loss.   Hematological:  Negative for adenopathy. Does not bruise/bleed easily.   Psychiatric/Behavioral:  Negative for confusion, sleep disturbance and suicidal ideas.       Visit Vitals  /80 (BP Location: Right arm)   Pulse 100   Temp 97.9 °F (36.6 °C) (Temporal)   Ht 5' 2.99" (1.6 m)   Wt 92.9 kg (204 lb 12.9 oz)   LMP  (LMP Unknown)   SpO2 98%   BMI 36.29 kg/m²       Physical Exam  Vitals reviewed.   Constitutional:       General: She is not in acute distress.     Appearance: Normal appearance.   HENT:      Head: Normocephalic and atraumatic.      Right Ear: Tympanic membrane, ear canal and external ear normal.      Left Ear: Tympanic membrane, ear canal and external ear normal.      Nose: Nose normal. No congestion.      Mouth/Throat:      Mouth: Mucous " membranes are moist.      Pharynx: Oropharynx is clear. No oropharyngeal exudate or posterior oropharyngeal erythema.   Eyes:      Extraocular Movements: Extraocular movements intact.      Conjunctiva/sclera: Conjunctivae normal.      Pupils: Pupils are equal, round, and reactive to light.   Cardiovascular:      Rate and Rhythm: Normal rate and regular rhythm.      Pulses: Normal pulses.      Heart sounds: No murmur heard.  Pulmonary:      Effort: Pulmonary effort is normal.      Breath sounds: Normal breath sounds.   Abdominal:      General: Bowel sounds are normal.      Palpations: Abdomen is soft.      Tenderness: There is no abdominal tenderness.   Musculoskeletal:         General: No swelling, tenderness or deformity. Normal range of motion.      Cervical back: Normal range of motion and neck supple.   Lymphadenopathy:      Cervical: No cervical adenopathy.   Skin:     General: Skin is warm and dry.      Capillary Refill: Capillary refill takes less than 2 seconds.      Coloration: Skin is not jaundiced.      Findings: No rash.   Neurological:      Mental Status: She is alert and oriented to person, place, and time.      Cranial Nerves: No cranial nerve deficit.   Psychiatric:         Mood and Affect: Mood normal.         Behavior: Behavior normal.        Labs Reviewed:  Chemistry:  Lab Results   Component Value Date     01/25/2023    K 4.5 01/25/2023    CHLORIDE 102 01/25/2023    BUN 13.0 01/25/2023    CREATININE 0.44 (L) 01/25/2023    EGFRNORACEVR >90 01/25/2023    GLUCOSE 171 (H) 01/25/2023    CALCIUM 9.3 01/25/2023    ALKPHOS 53 01/25/2023    LABPROT 6.9 01/25/2023    ALBUMIN 4.6 01/25/2023    AST 48 (H) 01/25/2023    ALT 61 (H) 01/25/2023    TSH 1.340 01/25/2023        Lab Results   Component Value Date    HGBA1C 4.2 01/25/2023        Hematology:  Lab Results   Component Value Date    WBC 8.2 01/25/2023    RBC 3.00 (L) 01/25/2023    HGB 9.5 (L) 01/25/2023    HCT 28.1 (L) 01/25/2023    MCV 93.7  01/25/2023    MCH 31.7 01/25/2023    MCHC 33.8 01/25/2023    MCHC 34.0 09/09/2022    RDW 23.7 (H) 01/25/2023     01/25/2023    MPV 10.2 01/25/2023       Lipid Panel:  Lab Results   Component Value Date    CHOL 105 01/25/2023    HDL 46 01/25/2023    TRIG 118 01/25/2023       1. Abnormal physical evaluation  Overview:  [facility]abl[facility]    Assessment & Plan:  Patient agrees to schedule her annual appointment with Dr. Su.  Patient declines influenza vaccination. Recommend biannual dental apt. Reviewed labs with patient.     Orders:  -     CBC Auto Differential; Future; Expected date: 02/01/2024  -     Comprehensive Metabolic Panel; Future; Expected date: 02/01/2024  -     Lipid Panel; Future; Expected date: 02/01/2024  -     TSH; Future; Expected date: 02/01/2024  -     Hemoglobin A1C; Future; Expected date: 02/01/2024    2. Hereditary spherocytosis  Overview:  Saw hematology in the past, recommended iron supplement. Follows up PRN      3. Anemia, unspecified type  Overview:  01/21/2022 H&H 8.5 and 25, rheumatology recommended q.o.d. iron supplement.        4. Obesity with body mass index 30 or greater  Assessment & Plan:  Patient educated on importance of diet and exercise.  Weight loss goals discussed. Recommended 30-45 minutes of exercise five days a week.  In addition, counseled patient on importance of low fat diet, limiting carbohydrate intake, and increasing protein and vegetable intake. Hand outs provided. All questions answered.       5. Influenza vaccination declined by patient    6. Neuropathy involving both lower extremities  Overview:  Established with Dr. Murray    Assessment & Plan:  Continue cymbalta and gabapentin      7. Depressive disorder  Overview:  Taking cymbalta for depression and neuropathy      Other orders  -     DULoxetine (CYMBALTA) 60 MG capsule; Take 2 capsules (120 mg total) by mouth once daily.  Dispense: 60 capsule; Refill:  11       Vaccinations:  Immunization History   Administered Date(s) Administered    DTaP 1984, 1984, 01/29/1985, 03/11/1986, 06/02/1989    HIB 01/29/1988    Influenza 01/05/2017    Influenza - Trivalent - PF (ADULT) 01/05/2017    MMR 08/13/1985    PPD Test 06/09/1998, 08/30/2000    Poliovirus 1984, 1984, 08/13/1985, 06/02/1989    Tdap 02/19/2015       Future Appointments   Date Time Provider Department Center   1/26/2024  8:10 AM LAB, [facility] LABORATORY DRAW STATION [facility] [place]   2/2/2024  9:00 AM [facility]       Follow up in about 1 year (around 2/1/2024) for Wellness, Labs prior to apt. . Call sooner if needed.    MILENA YIN    Lab Frequency Next Occurrence   CBC Auto Differential Once 02/01/2024   Comprehensive Metabolic Panel Once 02/01/2024   Lipid Panel Once 02/01/2024   TSH Once 02/01/2024   Hemoglobin A1C Once 02/01/2024

## 2023-03-16 ENCOUNTER — PATIENT MESSAGE (OUTPATIENT)
Dept: FAMILY MEDICINE | Facility: CLINIC | Age: 38
End: 2023-03-16
Payer: MEDICAID

## 2023-04-13 ENCOUNTER — LAB VISIT (OUTPATIENT)
Dept: LAB | Facility: HOSPITAL | Age: 38
End: 2023-04-13
Attending: OBSTETRICS & GYNECOLOGY
Payer: MEDICAID

## 2023-04-13 ENCOUNTER — OFFICE VISIT (OUTPATIENT)
Dept: OBSTETRICS AND GYNECOLOGY | Facility: CLINIC | Age: 38
End: 2023-04-13
Payer: MEDICAID

## 2023-04-13 VITALS
DIASTOLIC BLOOD PRESSURE: 80 MMHG | SYSTOLIC BLOOD PRESSURE: 132 MMHG | BODY MASS INDEX: 34.21 KG/M2 | WEIGHT: 200.38 LBS | HEIGHT: 64 IN | TEMPERATURE: 98 F

## 2023-04-13 DIAGNOSIS — Z12.4 CERVICAL CANCER SCREENING: ICD-10-CM

## 2023-04-13 DIAGNOSIS — N91.5 OLIGOMENORRHEA, UNSPECIFIED TYPE: ICD-10-CM

## 2023-04-13 DIAGNOSIS — Z01.411 ENCOUNTER FOR WELL WOMAN EXAM WITH ABNORMAL FINDINGS: Primary | ICD-10-CM

## 2023-04-13 DIAGNOSIS — N93.9 ABNORMAL UTERINE BLEEDING: ICD-10-CM

## 2023-04-13 DIAGNOSIS — N30.01 ACUTE CYSTITIS WITH HEMATURIA: ICD-10-CM

## 2023-04-13 LAB
B-HCG UR QL: NEGATIVE
BILIRUB UR QL STRIP: POSITIVE
CTP QC/QA: YES
ESTRADIOL SERPL HS-MCNC: 60 PG/ML
FSH SERPL-ACNC: 6.44 MIU/ML
GLUCOSE UR QL STRIP: NEGATIVE
KETONES UR QL STRIP: NEGATIVE
LEUKOCYTE ESTERASE UR QL STRIP: POSITIVE
LH SERPL-ACNC: 2.32 MIU/ML
PH, POC UA: 5
POC BLOOD, URINE: POSITIVE
POC NITRATES, URINE: POSITIVE
PROLACTIN LEVEL (OHS): 8.76 NG/ML (ref 5.18–26.53)
PROT UR QL STRIP: POSITIVE
SP GR UR STRIP: 1.02 (ref 1–1.03)
TESTOST SERPL-MCNC: 29.6 NG/DL (ref 5.77–77)
TSH SERPL-ACNC: 1.22 UIU/ML (ref 0.36–3.74)
UROBILINOGEN UR STRIP-ACNC: 0.2 (ref 0.1–1.1)

## 2023-04-13 PROCEDURE — 3079F DIAST BP 80-89 MM HG: CPT | Mod: CPTII,,, | Performed by: OBSTETRICS & GYNECOLOGY

## 2023-04-13 PROCEDURE — 84443 ASSAY THYROID STIM HORMONE: CPT

## 2023-04-13 PROCEDURE — 3075F PR MOST RECENT SYSTOLIC BLOOD PRESS GE 130-139MM HG: ICD-10-PCS | Mod: CPTII,,, | Performed by: OBSTETRICS & GYNECOLOGY

## 2023-04-13 PROCEDURE — 3079F PR MOST RECENT DIASTOLIC BLOOD PRESSURE 80-89 MM HG: ICD-10-PCS | Mod: CPTII,,, | Performed by: OBSTETRICS & GYNECOLOGY

## 2023-04-13 PROCEDURE — 82670 ASSAY OF TOTAL ESTRADIOL: CPT

## 2023-04-13 PROCEDURE — 84146 ASSAY OF PROLACTIN: CPT

## 2023-04-13 PROCEDURE — 81003 URINALYSIS AUTO W/O SCOPE: CPT | Mod: QW,,, | Performed by: OBSTETRICS & GYNECOLOGY

## 2023-04-13 PROCEDURE — 83001 ASSAY OF GONADOTROPIN (FSH): CPT

## 2023-04-13 PROCEDURE — 36415 COLL VENOUS BLD VENIPUNCTURE: CPT

## 2023-04-13 PROCEDURE — 81003 POCT URINALYSIS, DIPSTICK, AUTOMATED, W/O SCOPE: ICD-10-PCS | Mod: QW,,, | Performed by: OBSTETRICS & GYNECOLOGY

## 2023-04-13 PROCEDURE — 83002 ASSAY OF GONADOTROPIN (LH): CPT

## 2023-04-13 PROCEDURE — 3008F BODY MASS INDEX DOCD: CPT | Mod: CPTII,,, | Performed by: OBSTETRICS & GYNECOLOGY

## 2023-04-13 PROCEDURE — 99385 PREV VISIT NEW AGE 18-39: CPT | Mod: ,,, | Performed by: OBSTETRICS & GYNECOLOGY

## 2023-04-13 PROCEDURE — 99385 PR PREVENTIVE VISIT,NEW,18-39: ICD-10-PCS | Mod: ,,, | Performed by: OBSTETRICS & GYNECOLOGY

## 2023-04-13 PROCEDURE — 1159F MED LIST DOCD IN RCRD: CPT | Mod: CPTII,,, | Performed by: OBSTETRICS & GYNECOLOGY

## 2023-04-13 PROCEDURE — 3008F PR BODY MASS INDEX (BMI) DOCUMENTED: ICD-10-PCS | Mod: CPTII,,, | Performed by: OBSTETRICS & GYNECOLOGY

## 2023-04-13 PROCEDURE — 81025 URINE PREGNANCY TEST: CPT | Mod: ,,, | Performed by: OBSTETRICS & GYNECOLOGY

## 2023-04-13 PROCEDURE — 84403 ASSAY OF TOTAL TESTOSTERONE: CPT

## 2023-04-13 PROCEDURE — 82627 DEHYDROEPIANDROSTERONE: CPT | Mod: 90

## 2023-04-13 PROCEDURE — 3075F SYST BP GE 130 - 139MM HG: CPT | Mod: CPTII,,, | Performed by: OBSTETRICS & GYNECOLOGY

## 2023-04-13 PROCEDURE — 1159F PR MEDICATION LIST DOCUMENTED IN MEDICAL RECORD: ICD-10-PCS | Mod: CPTII,,, | Performed by: OBSTETRICS & GYNECOLOGY

## 2023-04-13 PROCEDURE — 81025 POCT URINE PREGNANCY: ICD-10-PCS | Mod: ,,, | Performed by: OBSTETRICS & GYNECOLOGY

## 2023-04-13 RX ORDER — SULFAMETHOXAZOLE AND TRIMETHOPRIM 800; 160 MG/1; MG/1
1 TABLET ORAL 2 TIMES DAILY
Qty: 10 TABLET | Refills: 0 | Status: SHIPPED | OUTPATIENT
Start: 2023-04-13 | End: 2023-04-18

## 2023-04-13 RX ORDER — GABAPENTIN 600 MG/1
1200 TABLET ORAL 2 TIMES DAILY
COMMUNITY
Start: 2023-03-31

## 2023-04-13 NOTE — PROGRESS NOTES
Chief Complaint: Annual exam    Chief Complaint   Patient presents with    Well Woman       HPI:   Carrie Lejeune is a 37 y.o. year old  here for her Annual Exam. Last visit here was in 2019.  She had irregular menses at the time and says periods are still infrequent and irregular.  LMP 23- 5 days in duration with heavy flow and moderate dysmenorrhea. Menses prior was 10/2022. Denies bleeding/pain with intercourse, vaginal discharge, odor. Denies personal or family hx of breast, uterine, ovarian, colon cancer. UA positive today in clinic. Denies s/s.   Currently not on contraception, has completed child bearing.     GYN History:  LMP: 23  Frequency: irregular, previous cycle 10/2022   Cycle Length: 5 days   Flow: heavy  Intermenstrual Bleeding: No  Postcoital Bleeding: No  Dysmenorrhea: Yes, Moderate  Sexually Active: Yes   Dyspareunia: No  Contraception: None   H/o STI: +Hx of HPV  Last pap: 19 - Negative w/ +High Risk HPV  H/o Abnormal Pap: Yes , 19 (+HPV), 17 (+HPV)  Gardasil: 0/3   MMG: N/A       Past Medical History:   Diagnosis Date    Anemia     Anxiety disorder, unspecified     BMI 34.0-34.9,adult     BMI 35.0-35.9,adult     Depression     DM (diabetes mellitus), gestational     Eczema     Elevated fasting blood sugar     Headache     Hereditary spherocytosis     Hyperbilirubinemia     Leukocytosis     Neuropathy     Personal history of COVID-19     Scleral icterus     Screening for cholesterol level     Screening for diabetes mellitus (DM)     Screening for thyroid disorder     Spherocytosis      Past Surgical History:   Procedure Laterality Date     SECTION  2004     SECTION  2003     SECTION  10/04/2016    CHOLECYSTECTOMY  2006    RIGHT OOPHORECTOMY Right 2017    TONSILLECTOMY         Current Outpatient Medications:     albuterol (PROVENTIL/VENTOLIN HFA) 90 mcg/actuation inhaler, Inhale 2 puffs into the lungs every 4 (four) hours  as needed for Wheezing. Rescue, Disp: , Rfl:     crisaborole (EUCRISA) 2 % Oint, Apply topically 2 (two) times daily., Disp: , Rfl:     gabapentin (NEURONTIN) 600 MG tablet, Take 1,200 mg by mouth 2 (two) times daily., Disp: , Rfl:     DULoxetine (CYMBALTA) 60 MG capsule, Take 2 capsules (120 mg total) by mouth once daily., Disp: 60 capsule, Rfl: 11  Review of patient's allergies indicates:  No Known Allergies  OB History    Para Term  AB Living   3 3 2 1   2   SAB IAB Ectopic Multiple Live Births           2      # Outcome Date GA Lbr Angel/2nd Weight Sex Delivery Anes PTL Lv   3  10/04/16 [id number]  1.956 kg (4 lb 5 oz) F CS-LTranv Spinal N FD      Complications: Fetal demise affecting delivery   2 Term 04   2.778 kg (6 lb 2 oz) M CS-LTranv Spinal N JENNIFER   1 Term 03   2.977 kg (6 lb 9 oz) M CS-LTranv Spinal N JENNIFER      Complications: Fetal Intolerance, Preeclampsia     Social History     Tobacco Use    Smoking status: Never     Passive exposure: Current (Mother smokes outside)    Smokeless tobacco: Never   Substance Use Topics    Alcohol use: Never     Comment: occasion    Drug use: Never     Family History   Problem Relation Age of Onset    No Known Problems Father     No Known Problems Mother     No Known Problems Brother     No Known Problems Sister     Breast cancer Neg Hx     Colon cancer Neg Hx     Ovarian cancer Neg Hx     Cervical cancer Neg Hx     Uterine cancer Neg Hx        Review of Systems:   Review of Systems   Constitutional:  Negative for appetite change, chills, fatigue, fever and unexpected weight change.   Eyes:  Negative for visual disturbance.   Respiratory:  Negative for cough, shortness of breath and wheezing.    Cardiovascular:  Negative for chest pain, palpitations and leg swelling.   Gastrointestinal:  Negative for abdominal pain, bloating, blood in stool, constipation, diarrhea, nausea, vomiting, reflux and fecal incontinence.   Endocrine: Negative for hair loss  "and hot flashes.   Genitourinary:  Positive for menorrhagia and menstrual problem. Negative for bladder incontinence, decreased libido, dysmenorrhea, dyspareunia, dysuria, flank pain, frequency, genital sores, hematuria, hot flashes, pelvic pain, urgency, vaginal bleeding, vaginal discharge, vaginal pain, urinary incontinence, postcoital bleeding, postmenopausal bleeding, vaginal dryness and vaginal odor.   Integumentary:  Negative for rash, acne, hair changes, breast mass, nipple discharge, breast skin changes and breast tenderness.   Neurological:  Negative for headaches.   Psychiatric/Behavioral:  Negative for depression.    Breast: Negative for asymmetry, breast self exam, lump, mass, mastodynia, nipple discharge, skin changes and tenderness     Physical Exam:  /80 (BP Location: Left arm, Patient Position: Sitting, BP Method: Large (Manual))   Temp 97.7 °F (36.5 °C) (Temporal)   Ht 5' 4.17" (1.63 m)   Wt 90.9 kg (200 lb 6.4 oz)   LMP 02/09/2023   BMI 34.21 kg/m²       Physical Exam:   Constitutional: She is oriented to person, place, and time. She appears well-developed and well-nourished.    HENT:   Head: Normocephalic.      Cardiovascular:       Exam reveals no edema.        Pulmonary/Chest: Effort normal. She exhibits no mass, no tenderness, no bony tenderness, no deformity and no retraction. Right breast exhibits no inverted nipple, no mass, no nipple discharge, no skin change, no tenderness, no bleeding, no swelling, no mastectomy, no augmentation and no lumpectomy. Left breast exhibits no inverted nipple, no mass, no nipple discharge, no skin change, no tenderness, no bleeding, no swelling, no mastectomy, no augmentation and no lumpectomy. Breasts are symmetrical.        Abdominal: Soft. She exhibits no distension and no mass. There is no abdominal tenderness. There is no rebound and no guarding. No hernia. Hernia confirmed negative in the right inguinal area.     Genitourinary:    Inguinal " canal, vagina, uterus, right adnexa, left adnexa and rectum normal.   Rectum:      No anal fissure or external hemorrhoid.   The external female genitalia was normal.   No external genitalia lesions identified,Genitalia hair distrobution normal .   Labial bartholins normal.There is no rash, tenderness, lesion or injury on the right labia. There is no rash, tenderness, lesion or injury on the left labia. Cervix is normal. No no masses or organomegaly. Right adnexum displays no mass, no tenderness and no fullness. Left adnexum displays no mass, no tenderness and no fullness. Vagina exhibits no lesion. No erythema,  no vaginal discharge, tenderness, bleeding, rectocele, cystocele or unspecified prolapse of vaginal walls in the vagina.    No foreign body in the vagina.      No signs of injury in the vagina.   Vagina was moist.Cervix exhibits no motion tenderness, no lesion, no discharge, no friability, no lesion, no tenderness and no polyp. Uterus consistancy normal. and Uerus contour normal  Uterus is not deviated, not enlarged, not fixed, not tender, not hosting fibroids and no uterine prolapse. Normal urethral meatus.Urethral Meatus exhibits: urethral lesion and prolapsedUrethra findings: no urethral mass and no tendernessBladder findings: no bladder tenderness          Musculoskeletal: Normal range of motion.      Lymphadenopathy: No inguinal adenopathy noted on the right or left side.    Neurological: She is alert and oriented to person, place, and time.    Skin: Skin is warm and dry.    Psychiatric: She has a normal mood and affect. Her behavior is normal. Judgment and thought content normal.      Assessment:   Annual Well Women Exam  1. Encounter for well woman exam with abnormal findings  - POCT urine pregnancy    2. Acute cystitis with hematuria  - POCT Urinalysis, Dipstick, Automated, W/O Scope  - Urine Culture High Risk        Plan:  Pap Done  Breast Self-awareness  Recommend exercise at least 3 times  weekly  Healthy, balanced diet  Keep yearly follow up with PCP    Discussed UA results today. Rx: Bactrim BID x5 days    Discussed abnormal uterine bleeding and potential causes including but not limited to: infection, fibroids, other anatomy abnormalities, hormonal changes or abnormalities, pre-cancerous lesions, malignancy.    Discussed importance of healthy diet, exercise, and achieving/maintaining a BMI <30.     Recommend endometrial sampling to rule out cancer prior to treatment.   Discussed office endometrial biopsy and hysteroscope with dilation and curettage and associated limitations, risks, and benefits of each procedure.   Patient elects office endometrial biopsy    Discussed potential risks of unopposed estrogen and treatment options at this time pending EMB results. Discussed Mirena IUD, cyclic provera, progesterone only OCP, surgery management and all associated risk in detail.     Discussed permanent sterilization in detail and alternative contraceptive options.  We discussed bilateral tubal ligation and LTC.   Discussed risks of both sterilization including surgical risks, risk of failure, risk of ectopic pregnancy, and risk of regret.     Desires to sign medicaid tubal form today in clinic.     FSH  LH   TSH  Prolactin   Estradiol  Dhea-s  Testosterone   Follow up for EMB.

## 2023-04-15 LAB
BACTERIA UR CULT: NORMAL
BACTERIA UR CULT: NORMAL

## 2023-04-17 ENCOUNTER — TELEPHONE (OUTPATIENT)
Dept: FAMILY MEDICINE | Facility: CLINIC | Age: 38
End: 2023-04-17
Payer: MEDICAID

## 2023-04-17 LAB — DHEA-S SERPL-MCNC: 146 MCG/DL (ref 45–295)

## 2023-04-17 NOTE — TELEPHONE ENCOUNTER
----- Message from Yajaira Baires MA sent at 4/17/2023  9:52 AM CDT -----  Regarding: Cervical Cancer Screening  Our records indicate the patient is due for a pap smear. Please find out if the patient has had one done in the last 3-5 years, so we can obtain those records.  If not, please send a referral to an OBGYN of her choice?

## 2023-04-19 ENCOUNTER — TELEPHONE (OUTPATIENT)
Dept: OBSTETRICS AND GYNECOLOGY | Facility: CLINIC | Age: 38
End: 2023-04-19
Payer: MEDICAID

## 2023-04-19 LAB — PSYCHE PATHOLOGY RESULT: NORMAL

## 2023-04-19 NOTE — TELEPHONE ENCOUNTER
----- Message from Rashel Su MD sent at 4/19/2023  4:41 PM CDT -----  Recommend Repeat pap with HPV cotest in 12 months.

## 2023-04-26 NOTE — PROGRESS NOTES
Chief complaint:  Endometrial Biopsy (Irregular Menses.  LMP: ~23.  Last Pap 23 - Neg w/ +High Risk HPV, Neg 16, 18, 45)      History of Present Illness:  Carrie Lejeune is a 37 y.o. female  presents for an endometrial biopsy. UPT is negative. Patient agrees to proceed.    PAP 23: WNL +HPV  23:  FSH: 6.44  LH : 2.32  TSH: 1.220  Prolactin : 8.76  Estradiol:60  Dhea-s:146  Testosterone:29.60    LMP: 23  Frequency: irregular, previous cycle 10/2022   Cycle Length: 5 days   Flow: heavy  Intermenstrual Bleeding: No  Postcoital Bleeding: No  Dysmenorrhea: Yes, Moderate  Sexually Active: Yes   Dyspareunia: No  Contraception: None   H/o STI: +Hx of HPV  Last pap: 19 - Negative w/ +High      Review of Systems:  General/Constitutional: Chills denies. Fatigue/weakness denies. Fever denies . Night sweats denies . Hot flashes denies  Gastrointestinal: Abdominal pain denies. Blood in stool denies. Constipation denies. Diarrhea denies. Heartburn denies. Nausea denies. Vomiting denies   Genitourinary: Incontinence denies. Blood in urine denies. Frequent urination denies.  Urgency denies. Painful urination denies. Nocturia denies   Gynecologic: Irregular menses denies.  Heavy bleeding  denies.  Painful menses denies.  Vaginal discharge denies. Vaginal odor denies. Vaginal itching/Irritation denies. Vaginal lesion denies.  Pelvic pain denies. Decreased libido denies. Vulvar lesion denies. Prolapse of genital organs denies. Painful intercourse denies. Postcoital bleeding denies   Psychiatric: Mood lability denies.  Depressed mood denies. Suicidal thoughts denies. Anxiety denies.  Overwhelmed denies.  Appetite normal. Energy level normal     OB History          3    Para   3    Term   2       1    AB        Living   2         SAB        IAB        Ectopic        Multiple        Live Births   2                 Past Medical History:   Diagnosis Date    Anemia     Anxiety disorder,  unspecified     BMI 34.0-34.9,adult     BMI 35.0-35.9,adult     Depression     DM (diabetes mellitus), gestational     Eczema     Elevated fasting blood sugar     Headache     Hereditary spherocytosis     Hyperbilirubinemia     Leukocytosis     Neuropathy     Personal history of COVID-19     Scleral icterus     Screening for cholesterol level     Screening for diabetes mellitus (DM)     Screening for thyroid disorder     Spherocytosis          Current Outpatient Medications:     albuterol (PROVENTIL/VENTOLIN HFA) 90 mcg/actuation inhaler, Inhale 2 puffs into the lungs every 4 (four) hours as needed for Wheezing. Rescue, Disp: , Rfl:     crisaborole (EUCRISA) 2 % Oint, Apply topically 2 (two) times daily., Disp: , Rfl:     DULoxetine (CYMBALTA) 60 MG capsule, Take 2 capsules (120 mg total) by mouth once daily., Disp: 60 capsule, Rfl: 11    gabapentin (NEURONTIN) 600 MG tablet, Take 1,200 mg by mouth 2 (two) times daily., Disp: , Rfl:     Review of patient's allergies indicates:  No Known Allergies    Past Surgical History:   Procedure Laterality Date     SECTION  2004     SECTION  2003     SECTION  10/04/2016    CHOLECYSTECTOMY  2006    RIGHT OOPHORECTOMY Right 2017    TONSILLECTOMY         Family History   Problem Relation Age of Onset    No Known Problems Father     No Known Problems Mother     No Known Problems Brother     No Known Problems Sister     Breast cancer Neg Hx     Colon cancer Neg Hx     Ovarian cancer Neg Hx     Cervical cancer Neg Hx     Uterine cancer Neg Hx        Social History     Socioeconomic History    Marital status: Single   Tobacco Use    Smoking status: Never     Passive exposure: Current (Mother smokes outside)    Smokeless tobacco: Never   Substance and Sexual Activity    Alcohol use: Never     Comment: occasion    Drug use: Never    Sexual activity: Yes     Partners: Male     Birth control/protection: None     Comment: STI: HPV          Physical Exam:  LMP 02/09/2023       Chaperone present.     Constitutional: General appearance: healthy, well-nourished and well-developed   Psychiatric: Orientation to time, place and person. Normal mood and affect and active, alert   Abdomen: Auscultation/Inspection/Palpation: No tenderness or masses. Soft, nondistended    Female Genitalia:      Vulva: no masses, atrophy or lesions      Bladder/Urethra: no urethral discharge or mass, normal meatus, bladder non-distended.      Vagina: no tenderness, erythema, cystocele, rectocele, abnormal vaginal discharge, or vesicle(s) or ulcers                   Cervix: no discharge or cervical motion tenderness and grossly normal      Uterus: normal size and shape and midline, non-tender, and no uterine prolapse.      Adnexa/Parametria: no parametrial tenderness or mass, no adnexal tenderness or ovarian mass.       PROCEDURE:   Verbal consent was obtained.      UPT negative.       Speculum was placed in the vagina. Cervix was cleaned with 3 iodine. The anterior lip of the cervix was then grasped with a single tooth tenaculum. Endometrial Pipette was then advanced into the uterus. Suction was then applied to the endometrial Pipette curettage and was performed to 360 degrees. Pipette was then removed from the uterus and the specimen was then placed in specimen cup adequate tissue was confirmed. Tenaculum was then removed from the patient's cervix.  Excellent hemostasis was achieved. All instruments removed from the patient's vagina.   The specimen was placed in formalyn and sent to Pathology for histology evaluation.The patient tolerated the procedure well.      Assessment/Plan:  1. Abnormal uterine bleeding  -     POCT urine pregnancy           POST ENDOMETRIAL BIOPSY COUNSELING:  Manage post biopsy cramping with NSAIDs or Tylenol.  Expect spotting or light bleeding for a few days.  Report bleeding heavier than a period, fever > 101.0 F, worsening pain or a foul  smelling vaginal discharge  Pelvic rest    All questions were answered.    Labs reviewed with patient    RTC 3 weeks for EMB results, surgical discussion

## 2023-04-27 ENCOUNTER — PROCEDURE VISIT (OUTPATIENT)
Dept: OBSTETRICS AND GYNECOLOGY | Facility: CLINIC | Age: 38
End: 2023-04-27
Payer: MEDICAID

## 2023-04-27 VITALS
WEIGHT: 201.75 LBS | TEMPERATURE: 98 F | SYSTOLIC BLOOD PRESSURE: 136 MMHG | RESPIRATION RATE: 20 BRPM | DIASTOLIC BLOOD PRESSURE: 79 MMHG | HEIGHT: 63 IN | BODY MASS INDEX: 35.75 KG/M2 | HEART RATE: 91 BPM

## 2023-04-27 DIAGNOSIS — N93.9 ABNORMAL UTERINE BLEEDING: Primary | ICD-10-CM

## 2023-04-27 LAB
B-HCG UR QL: NEGATIVE
CTP QC/QA: YES

## 2023-04-27 PROCEDURE — 58100 PR BIOPSY OF UTERUS LINING: ICD-10-PCS | Mod: ,,, | Performed by: OBSTETRICS & GYNECOLOGY

## 2023-04-27 PROCEDURE — 81025 POCT URINE PREGNANCY: ICD-10-PCS | Mod: ,,, | Performed by: OBSTETRICS & GYNECOLOGY

## 2023-04-27 PROCEDURE — 58100 BIOPSY OF UTERUS LINING: CPT | Mod: ,,, | Performed by: OBSTETRICS & GYNECOLOGY

## 2023-04-27 PROCEDURE — 81025 URINE PREGNANCY TEST: CPT | Mod: ,,, | Performed by: OBSTETRICS & GYNECOLOGY

## 2023-04-27 PROCEDURE — 99499 UNLISTED E&M SERVICE: CPT | Mod: ,,, | Performed by: OBSTETRICS & GYNECOLOGY

## 2023-04-27 PROCEDURE — 99499 NO LOS: ICD-10-PCS | Mod: ,,, | Performed by: OBSTETRICS & GYNECOLOGY

## 2023-04-27 RX ORDER — PREGABALIN 100 MG/1
100 CAPSULE ORAL 3 TIMES DAILY
COMMUNITY
Start: 2023-04-17

## 2023-05-08 ENCOUNTER — PATIENT MESSAGE (OUTPATIENT)
Dept: FAMILY MEDICINE | Facility: CLINIC | Age: 38
End: 2023-05-08
Payer: MEDICAID

## 2023-05-08 PROBLEM — Z00.01 ABNORMAL PHYSICAL EVALUATION: Status: RESOLVED | Noted: 2023-01-31 | Resolved: 2023-05-08

## 2023-05-08 LAB — PSYCHE PATHOLOGY RESULT: NORMAL

## 2023-05-16 ENCOUNTER — PATIENT MESSAGE (OUTPATIENT)
Dept: OBSTETRICS AND GYNECOLOGY | Facility: CLINIC | Age: 38
End: 2023-05-16

## 2023-05-16 ENCOUNTER — OFFICE VISIT (OUTPATIENT)
Dept: OBSTETRICS AND GYNECOLOGY | Facility: CLINIC | Age: 38
End: 2023-05-16
Payer: MEDICAID

## 2023-05-16 VITALS
SYSTOLIC BLOOD PRESSURE: 130 MMHG | HEIGHT: 63 IN | WEIGHT: 206.63 LBS | DIASTOLIC BLOOD PRESSURE: 86 MMHG | BODY MASS INDEX: 36.61 KG/M2

## 2023-05-16 DIAGNOSIS — Z30.09 STERILIZATION EDUCATION: Primary | ICD-10-CM

## 2023-05-16 DIAGNOSIS — N93.9 ABNORMAL UTERINE BLEEDING: ICD-10-CM

## 2023-05-16 PROCEDURE — 1159F PR MEDICATION LIST DOCUMENTED IN MEDICAL RECORD: ICD-10-PCS | Mod: CPTII,,, | Performed by: OBSTETRICS & GYNECOLOGY

## 2023-05-16 PROCEDURE — 1159F MED LIST DOCD IN RCRD: CPT | Mod: CPTII,,, | Performed by: OBSTETRICS & GYNECOLOGY

## 2023-05-16 PROCEDURE — 3079F DIAST BP 80-89 MM HG: CPT | Mod: CPTII,,, | Performed by: OBSTETRICS & GYNECOLOGY

## 2023-05-16 PROCEDURE — 99213 PR OFFICE/OUTPT VISIT, EST, LEVL III, 20-29 MIN: ICD-10-PCS | Mod: ,,, | Performed by: OBSTETRICS & GYNECOLOGY

## 2023-05-16 PROCEDURE — 3075F PR MOST RECENT SYSTOLIC BLOOD PRESS GE 130-139MM HG: ICD-10-PCS | Mod: CPTII,,, | Performed by: OBSTETRICS & GYNECOLOGY

## 2023-05-16 PROCEDURE — 3075F SYST BP GE 130 - 139MM HG: CPT | Mod: CPTII,,, | Performed by: OBSTETRICS & GYNECOLOGY

## 2023-05-16 PROCEDURE — 3008F PR BODY MASS INDEX (BMI) DOCUMENTED: ICD-10-PCS | Mod: CPTII,,, | Performed by: OBSTETRICS & GYNECOLOGY

## 2023-05-16 PROCEDURE — 3079F PR MOST RECENT DIASTOLIC BLOOD PRESSURE 80-89 MM HG: ICD-10-PCS | Mod: CPTII,,, | Performed by: OBSTETRICS & GYNECOLOGY

## 2023-05-16 PROCEDURE — 99213 OFFICE O/P EST LOW 20 MIN: CPT | Mod: ,,, | Performed by: OBSTETRICS & GYNECOLOGY

## 2023-05-16 PROCEDURE — 3008F BODY MASS INDEX DOCD: CPT | Mod: CPTII,,, | Performed by: OBSTETRICS & GYNECOLOGY

## 2023-05-16 NOTE — PROGRESS NOTES
Chief Complaint     Follow-up (Follow up from EMB )    HPI:     Patient is a 37 y.o.  with AUB presents today for EMB results     EMB Biopsy:  ENDOMETRIAL BIOPSY:  SCANT FRAGMENTS OF BENIGN ENDOMETRIAL SURFACE MUCOSA.  NO EVIDENCE OF HYPERPLASIA, NEOPLASIA OR CHRONIC ENDOMETRITIS.    GYN history:  LMP: 23  Frequency: irregular, previous cycle 10/2022  Cycle Length: 5 days  Flow: heavy  Intermenstrual Bleeding: No  Postcoital Bleeding: No  Dysmenorrhea: Yes, Moderate  Sexually Active: Yes  Dyspareunia: No  Contraception: None  H/o STI: +Hx of HPV  Last pap: 23- Negative w/ +High Risk HPV, Neg 16, 18, 45  H/o Abnormal Pap: Yes , 19 (+HPV), 17 (+HPV)  Gardasil: 0/3  MMG: N/A       LMP: 23  Frequency: irregular, previous cycle 10/2022   Cycle Length: 5 days   Flow: heavy  Intermenstrual Bleeding: No  Postcoital Bleeding: No  Dysmenorrhea: Yes, Moderate  Sexually Active: Yes   Dyspareunia: No  Contraception: None   H/o STI: +Hx of HPV  Last pap: 23- Negative w/ +High    Past Medical History:   Diagnosis Date    Anemia     Anxiety disorder, unspecified     BMI 34.0-34.9,adult     BMI 35.0-35.9,adult     Depression     DM (diabetes mellitus), gestational     Eczema     Elevated fasting blood sugar     Headache     Hereditary spherocytosis     Hyperbilirubinemia     Leukocytosis     Neuropathy     Personal history of COVID-19     Scleral icterus     Screening for cholesterol level     Screening for diabetes mellitus (DM)     Screening for thyroid disorder     Spherocytosis        Past Surgical History:   Procedure Laterality Date     SECTION  2004     SECTION  2003     SECTION  10/04/2016    CHOLECYSTECTOMY  2006    ENDOMETRIAL BIOPSY  2023    RIGHT OOPHORECTOMY Right 2017    TONSILLECTOMY         Family History   Problem Relation Age of Onset    No Known Problems Father     No Known Problems Mother     No Known Problems Brother      "No Known Problems Sister     Breast cancer Neg Hx     Colon cancer Neg Hx     Ovarian cancer Neg Hx     Cervical cancer Neg Hx     Uterine cancer Neg Hx        OB History          3    Para   3    Term   2       1    AB        Living   2         SAB        IAB        Ectopic        Multiple        Live Births   2                 Current Outpatient Medications on File Prior to Visit   Medication Sig Dispense Refill    albuterol (PROVENTIL/VENTOLIN HFA) 90 mcg/actuation inhaler Inhale 2 puffs into the lungs every 4 (four) hours as needed for Wheezing. Rescue      crisaborole (EUCRISA) 2 % Oint Apply topically 2 (two) times daily.      DULoxetine (CYMBALTA) 60 MG capsule Take 2 capsules (120 mg total) by mouth once daily. 60 capsule 11    gabapentin (NEURONTIN) 600 MG tablet Take 1,200 mg by mouth 2 (two) times daily.      pregabalin (LYRICA) 100 MG capsule Take 100 mg by mouth 3 (three) times daily.       No current facility-administered medications on file prior to visit.       Review of Systems:       Review of Systems   Constitutional:  Negative for chills and fever.   Gastrointestinal:  Negative for abdominal pain, constipation and diarrhea.   Genitourinary:  Positive for dyspareunia, menorrhagia and menstrual problem. Negative for bladder incontinence, decreased libido, dysmenorrhea, dysuria, flank pain, frequency, genital sores, hematuria, hot flashes, pelvic pain, urgency, vaginal bleeding, vaginal discharge, vaginal pain, urinary incontinence, postcoital bleeding, postmenopausal bleeding, vaginal dryness and vaginal odor.      Physical Exam:    /86 (BP Location: Right arm)   Ht 5' 3" (1.6 m)   Wt 93.7 kg (206 lb 9.6 oz)   LMP  (LMP Unknown)   BMI 36.60 kg/m²     Physical Exam   deferred    Assessment:   1. Sterilization education    2. Abnormal uterine bleeding        Plan:       Reviewed emb results with patient and discussed management of Aub at this time.   Desires tubal ligation for " sterilization due to completing child bearing years at this time. Discussed all procedures and associated risks in detail  She needs to check with work to determine best day for surgery, she will call us tomorrow with a  desired date  Will plan for Tubal ligation, hyst d&C, endometrial ablation   RTC pre admit

## 2023-05-18 PROBLEM — Z30.09 STERILIZATION EDUCATION: Status: ACTIVE | Noted: 2023-05-18

## 2023-06-05 NOTE — H&P (VIEW-ONLY)
History & Physical    SUBJECTIVE:     History of Present Illness:  Patient is a 37 y.o. female  with AUB and undesired fertility here today to pre admit for Tubal ligation, hyst d&C, endometrial ablation     Chief Complaint   Patient presents with    Pre-op Exam     Pre op for tubal, hyst D&C, ablation.       Per previous note 23:  Patient is a 37 y.o.  with AUB presents today for EMB results      EMB Biopsy:  ENDOMETRIAL BIOPSY:  SCANT FRAGMENTS OF BENIGN ENDOMETRIAL SURFACE MUCOSA.  NO EVIDENCE OF HYPERPLASIA, NEOPLASIA OR CHRONIC ENDOMETRITIS.     23:  Carrie Lejeune is a 37 y.o. year old  here for her Annual Exam. Last visit here was in 2019.  She had irregular menses at the time and says periods are still infrequent and irregular.  LMP 23- 5 days in duration with heavy flow and moderate dysmenorrhea. Menses prior was 10/2022. Denies bleeding/pain with intercourse, vaginal discharge, odor. Denies personal or family hx of breast, uterine, ovarian, colon cancer. UA positive today in clinic. Denies s/s.   Currently not on contraception, has completed child bearing.        Review of patient's allergies indicates:  No Known Allergies    Current Outpatient Medications   Medication Sig Dispense Refill    albuterol (PROVENTIL/VENTOLIN HFA) 90 mcg/actuation inhaler Inhale 2 puffs into the lungs every 4 (four) hours as needed for Wheezing. Rescue      crisaborole (EUCRISA) 2 % Oint Apply topically 2 (two) times daily.      pregabalin (LYRICA) 100 MG capsule Take 100 mg by mouth 3 (three) times daily.      DULoxetine (CYMBALTA) 60 MG capsule Take 2 capsules (120 mg total) by mouth once daily. 60 capsule 11    gabapentin (NEURONTIN) 600 MG tablet Take 1,200 mg by mouth 2 (two) times daily.       No current facility-administered medications for this visit.       Past Medical History:   Diagnosis Date    Anemia     Anxiety disorder, unspecified     BMI 34.0-34.9,adult     BMI 35.0-35.9,adult  "    Depression     DM (diabetes mellitus), gestational     Eczema     Elevated fasting blood sugar     Headache     Hereditary spherocytosis     Hyperbilirubinemia     Leukocytosis     Neuropathy     Personal history of COVID-19     Scleral icterus     Screening for cholesterol level     Screening for diabetes mellitus (DM)     Screening for thyroid disorder     Spherocytosis      Past Surgical History:   Procedure Laterality Date     SECTION  2004     SECTION  2003     SECTION  10/04/2016    CHOLECYSTECTOMY  2006    ENDOMETRIAL BIOPSY  2023    RIGHT OOPHORECTOMY Right 2017    TONSILLECTOMY       Family History   Problem Relation Age of Onset    No Known Problems Father     No Known Problems Mother     No Known Problems Brother     No Known Problems Sister     Breast cancer Neg Hx     Colon cancer Neg Hx     Ovarian cancer Neg Hx     Cervical cancer Neg Hx     Uterine cancer Neg Hx      Social History     Tobacco Use    Smoking status: Never     Passive exposure: Never (Mother smokes outside)    Smokeless tobacco: Never   Substance Use Topics    Alcohol use: Never     Comment: occasion    Drug use: Never        Review of Systems:  Review of Systems  Constitutional:  Negative for chills and fever.   Gastrointestinal:  Negative for abdominal pain, constipation and diarrhea.   Genitourinary:  Positive for dyspareunia, menorrhagia and menstrual problem. Negative for bladder incontinence, decreased libido, dysmenorrhea, dysuria, flank pain, frequency, genital sores, hematuria, hot flashes, pelvic pain, urgency, vaginal bleeding, vaginal discharge, vaginal pain, urinary incontinence, postcoital bleeding, postmenopausal bleeding, vaginal dryness and vaginal odor.      OBJECTIVE:     Vital Signs (Most Recent)  BP: 130/76 (23 1136)  5' 3" (1.6 m)  92.5 kg (204 lb)     Physical Exam:  Physical Exam  deferred    ASSESSMENT/PLAN:     1. Abnormal uterine bleeding    2. " Sterilization education        PLAN:  Discussed permanent sterilization in detail and alternative contraceptive options.  We discussed bilateral tubal ligation and LTC.   Discussed risks of both sterilization including surgical risks, risk of failure, risk of ectopic pregnancy, and risk of regret.   Pt wants to proceed with surgery.    Discussed treatment options for abnormal bleeding. Pt wants to proceed with surgery  Discussed hysteroscope D&C and Novasure endometrial ablation in detail. Pt understands risks and benefits including surgical risks and risks of failure, post ablation syndrome.     Consent given  Plan for tubal ligation with endometrial ablation on 6/28/23  NPO after midnight before surgery    RTC post op

## 2023-06-08 ENCOUNTER — OFFICE VISIT (OUTPATIENT)
Dept: OBSTETRICS AND GYNECOLOGY | Facility: CLINIC | Age: 38
End: 2023-06-08
Payer: MEDICAID

## 2023-06-08 VITALS
SYSTOLIC BLOOD PRESSURE: 130 MMHG | BODY MASS INDEX: 36.14 KG/M2 | WEIGHT: 204 LBS | DIASTOLIC BLOOD PRESSURE: 76 MMHG | HEIGHT: 63 IN

## 2023-06-08 DIAGNOSIS — Z30.09 STERILIZATION EDUCATION: ICD-10-CM

## 2023-06-08 DIAGNOSIS — N93.9 ABNORMAL UTERINE BLEEDING: Primary | ICD-10-CM

## 2023-06-08 PROCEDURE — 99214 OFFICE O/P EST MOD 30 MIN: CPT | Mod: ,,, | Performed by: OBSTETRICS & GYNECOLOGY

## 2023-06-08 PROCEDURE — 1159F PR MEDICATION LIST DOCUMENTED IN MEDICAL RECORD: ICD-10-PCS | Mod: CPTII,,, | Performed by: OBSTETRICS & GYNECOLOGY

## 2023-06-08 PROCEDURE — 1159F MED LIST DOCD IN RCRD: CPT | Mod: CPTII,,, | Performed by: OBSTETRICS & GYNECOLOGY

## 2023-06-08 PROCEDURE — 3008F PR BODY MASS INDEX (BMI) DOCUMENTED: ICD-10-PCS | Mod: CPTII,,, | Performed by: OBSTETRICS & GYNECOLOGY

## 2023-06-08 PROCEDURE — 3075F SYST BP GE 130 - 139MM HG: CPT | Mod: CPTII,,, | Performed by: OBSTETRICS & GYNECOLOGY

## 2023-06-08 PROCEDURE — 99214 PR OFFICE/OUTPT VISIT, EST, LEVL IV, 30-39 MIN: ICD-10-PCS | Mod: ,,, | Performed by: OBSTETRICS & GYNECOLOGY

## 2023-06-08 PROCEDURE — 3008F BODY MASS INDEX DOCD: CPT | Mod: CPTII,,, | Performed by: OBSTETRICS & GYNECOLOGY

## 2023-06-08 PROCEDURE — 3078F PR MOST RECENT DIASTOLIC BLOOD PRESSURE < 80 MM HG: ICD-10-PCS | Mod: CPTII,,, | Performed by: OBSTETRICS & GYNECOLOGY

## 2023-06-08 PROCEDURE — 3078F DIAST BP <80 MM HG: CPT | Mod: CPTII,,, | Performed by: OBSTETRICS & GYNECOLOGY

## 2023-06-08 PROCEDURE — 3075F PR MOST RECENT SYSTOLIC BLOOD PRESS GE 130-139MM HG: ICD-10-PCS | Mod: CPTII,,, | Performed by: OBSTETRICS & GYNECOLOGY

## 2023-06-22 ENCOUNTER — HOSPITAL ENCOUNTER (OUTPATIENT)
Dept: PREADMISSION TESTING | Facility: HOSPITAL | Age: 38
Discharge: HOME | End: 2023-06-22
Payer: MEDICAID

## 2023-06-22 ENCOUNTER — TELEPHONE (OUTPATIENT)
Dept: OBSTETRICS AND GYNECOLOGY | Facility: CLINIC | Age: 38
End: 2023-06-22
Payer: MEDICAID

## 2023-06-22 VITALS — HEIGHT: 63 IN | WEIGHT: 204 LBS | BODY MASS INDEX: 36.14 KG/M2

## 2023-06-22 DIAGNOSIS — Z30.09 STERILIZATION EDUCATION: Primary | ICD-10-CM

## 2023-06-22 DIAGNOSIS — N93.9 ABNORMAL UTERINE BLEEDING: ICD-10-CM

## 2023-06-22 DIAGNOSIS — Z30.09 STERILIZATION EDUCATION: ICD-10-CM

## 2023-06-22 LAB
ALBUMIN SERPL-MCNC: 4.4 G/DL (ref 3.4–5)
ALBUMIN/GLOB SERPL: 2 RATIO
ALP SERPL-CCNC: 47 UNIT/L (ref 50–144)
ALT SERPL-CCNC: 43 UNIT/L (ref 1–45)
ANION GAP SERPL CALC-SCNC: 6 MEQ/L (ref 2–13)
ANISOCYTOSIS BLD QL SMEAR: ABNORMAL
APPEARANCE UR: CLEAR
AST SERPL-CCNC: 33 UNIT/L (ref 14–36)
BASOPHILS # BLD AUTO: 0.05 X10(3)/MCL (ref 0.01–0.08)
BASOPHILS NFR BLD AUTO: 0.6 % (ref 0.1–1.2)
BILIRUB UR QL STRIP.AUTO: ABNORMAL MG/DL
BILIRUBIN DIRECT+TOT PNL SERPL-MCNC: 3.4 MG/DL (ref 0–1)
BUN SERPL-MCNC: 11 MG/DL (ref 7–20)
CALCIUM SERPL-MCNC: 8.6 MG/DL (ref 8.4–10.2)
CHLORIDE SERPL-SCNC: 105 MMOL/L (ref 98–110)
CO2 SERPL-SCNC: 27 MMOL/L (ref 21–32)
COLOR UR: YELLOW
CREAT SERPL-MCNC: 0.34 MG/DL (ref 0.66–1.25)
CREAT/UREA NIT SERPL: 32 (ref 12–20)
EOSINOPHIL # BLD AUTO: 0.19 X10(3)/MCL (ref 0.04–0.36)
EOSINOPHIL NFR BLD AUTO: 2.3 % (ref 0.7–7)
ERYTHROCYTE [DISTWIDTH] IN BLOOD BY AUTOMATED COUNT: 23.9 % (ref 11–14.5)
GFR SERPLBLD CREATININE-BSD FMLA CKD-EPI: >90 MLS/MIN/1.73/M2
GLOBULIN SER-MCNC: 2.2 GM/DL (ref 2–3.9)
GLUCOSE SERPL-MCNC: 160 MG/DL (ref 70–115)
GLUCOSE UR QL STRIP.AUTO: NEGATIVE MG/DL
HCT VFR BLD AUTO: 24.8 % (ref 36–48)
HGB BLD-MCNC: 8.9 G/DL (ref 11.8–16)
IMM GRANULOCYTES # BLD AUTO: 0.18 X10(3)/MCL (ref 0–0.03)
IMM GRANULOCYTES NFR BLD AUTO: 2.2 % (ref 0–0.5)
KETONES UR QL STRIP.AUTO: NEGATIVE MG/DL
LEUKOCYTE ESTERASE UR QL STRIP.AUTO: NEGATIVE UNIT/L
LYMPHOCYTES # BLD AUTO: 1.86 X10(3)/MCL (ref 1.16–3.74)
LYMPHOCYTES NFR BLD AUTO: 22.6 % (ref 20–55)
MCH RBC QN AUTO: 32.8 PG (ref 27–34)
MCHC RBC AUTO-ENTMCNC: 35.9 G/DL (ref 31–37)
MCV RBC AUTO: 91.5 FL (ref 79–99)
MICROCYTES BLD QL SMEAR: ABNORMAL
MONOCYTES # BLD AUTO: 0.58 X10(3)/MCL (ref 0.24–0.36)
MONOCYTES NFR BLD AUTO: 7.1 % (ref 4.7–12.5)
NEUTROPHILS # BLD AUTO: 5.36 X10(3)/MCL (ref 1.56–6.13)
NEUTROPHILS NFR BLD AUTO: 65.2 % (ref 37–73)
NITRITE UR QL STRIP.AUTO: NEGATIVE
NRBC BLD AUTO-RTO: 3.9 %
PH UR STRIP.AUTO: 6 [PH]
PLATELET # BLD AUTO: 143 X10(3)/MCL (ref 140–371)
PLATELET # BLD EST: ADEQUATE 10*3/UL
PMV BLD AUTO: 9.6 FL (ref 9.4–12.4)
POTASSIUM SERPL-SCNC: 3.9 MMOL/L (ref 3.5–5.1)
PROT SERPL-MCNC: 6.6 GM/DL (ref 6.3–8.2)
PROT UR QL STRIP.AUTO: ABNORMAL MG/DL
RBC # BLD AUTO: 2.71 X10(6)/MCL (ref 4–5.1)
RBC UR QL AUTO: NEGATIVE UNIT/L
SODIUM SERPL-SCNC: 138 MMOL/L (ref 135–145)
SP GR UR STRIP.AUTO: >=1.03
UROBILINOGEN UR STRIP-ACNC: 1 MG/DL
WBC # SPEC AUTO: 8.22 X10(3)/MCL (ref 4–11.5)

## 2023-06-22 PROCEDURE — 81003 URINALYSIS AUTO W/O SCOPE: CPT | Performed by: OBSTETRICS & GYNECOLOGY

## 2023-06-22 PROCEDURE — 85025 COMPLETE CBC W/AUTO DIFF WBC: CPT | Performed by: OBSTETRICS & GYNECOLOGY

## 2023-06-22 PROCEDURE — 80053 COMPREHEN METABOLIC PANEL: CPT | Performed by: OBSTETRICS & GYNECOLOGY

## 2023-06-22 RX ORDER — MUPIROCIN 20 MG/G
OINTMENT TOPICAL
Status: CANCELLED | OUTPATIENT
Start: 2023-06-22

## 2023-06-22 RX ORDER — SODIUM CHLORIDE 9 MG/ML
INJECTION, SOLUTION INTRAVENOUS CONTINUOUS
Status: CANCELLED | OUTPATIENT
Start: 2023-06-22

## 2023-06-22 RX ORDER — FAMOTIDINE 20 MG/1
20 TABLET, FILM COATED ORAL
Status: CANCELLED | OUTPATIENT
Start: 2023-06-22

## 2023-06-22 NOTE — DISCHARGE INSTRUCTIONS

## 2023-06-27 ENCOUNTER — APPOINTMENT (OUTPATIENT)
Dept: LAB | Facility: HOSPITAL | Age: 38
End: 2023-06-27
Attending: OBSTETRICS & GYNECOLOGY
Payer: MEDICAID

## 2023-06-27 ENCOUNTER — ANESTHESIA EVENT (OUTPATIENT)
Dept: SURGERY | Facility: HOSPITAL | Age: 38
End: 2023-06-27
Payer: MEDICAID

## 2023-06-27 DIAGNOSIS — Z01.818 PREOP TESTING: Primary | ICD-10-CM

## 2023-06-27 LAB
ABO AND RH: NORMAL
ANTIBODY SCREEN: NORMAL
B-HCG SERPL QL: NEGATIVE
SPECIMEN OUTDATE: NORMAL

## 2023-06-27 PROCEDURE — 81025 URINE PREGNANCY TEST: CPT | Performed by: OBSTETRICS & GYNECOLOGY

## 2023-06-27 PROCEDURE — 36415 COLL VENOUS BLD VENIPUNCTURE: CPT

## 2023-06-27 PROCEDURE — 86900 BLOOD TYPING SEROLOGIC ABO: CPT | Performed by: OBSTETRICS & GYNECOLOGY

## 2023-06-27 NOTE — ANESTHESIA PREPROCEDURE EVALUATION
06/27/2023  Carrie Lejeune is a 38 y.o., female.      Pre-op Assessment    I have reviewed the Patient Summary Reports.     I have reviewed the Nursing Notes. I have reviewed the NPO Status.   I have reviewed the Medications.     Review of Systems  Anesthesia Hx:  No problems with previous Anesthesia  Denies Family Hx of Anesthesia complications.   Denies Personal Hx of Anesthesia complications.   Hematology/Oncology:  Hematology Normal   Oncology Normal     EENT/Dental:EENT/Dental Normal   Cardiovascular:  Cardiovascular Normal Exercise tolerance: good     Pulmonary:  Pulmonary Normal    Renal/:  Renal/ Normal     Hepatic/GI:  Hepatic/GI Normal    Musculoskeletal:  Musculoskeletal Normal    Neurological:   Neuromuscular Disease, Headaches  Dx of Headaches, Sinus Headache   Endocrine:  Endocrine Normal    Dermatological:  Skin Normal    Psych:   Psychiatric History anxiety depression             Anesthesia Plan  Type of Anesthesia, risks & benefits discussed:    Anesthesia Type: Gen ETT  Intra-op Monitoring Plan: Standard ASA Monitors  Post Op Pain Control Plan: multimodal analgesia  Induction:  IV  Airway Plan: Direct  Informed Consent: Informed consent signed with the Patient and all parties understand the risks and agree with anesthesia plan.  All questions answered. Patient consented to blood products? Yes  ASA Score: 3  Day of Surgery Review of History & Physical: H&P Update referred to the surgeon/provider.I have interviewed and examined the patient. I have reviewed the patient's H&P dated: There are no significant changes.     Ready For Surgery From Anesthesia Perspective.     .

## 2023-06-28 ENCOUNTER — HOSPITAL ENCOUNTER (OUTPATIENT)
Facility: HOSPITAL | Age: 38
Discharge: HOME | End: 2023-06-28
Attending: OBSTETRICS & GYNECOLOGY | Admitting: OBSTETRICS & GYNECOLOGY
Payer: MEDICAID

## 2023-06-28 ENCOUNTER — ANESTHESIA (OUTPATIENT)
Dept: SURGERY | Facility: HOSPITAL | Age: 38
End: 2023-06-28
Payer: MEDICAID

## 2023-06-28 VITALS
DIASTOLIC BLOOD PRESSURE: 80 MMHG | BODY MASS INDEX: 36.14 KG/M2 | HEIGHT: 63 IN | RESPIRATION RATE: 18 BRPM | SYSTOLIC BLOOD PRESSURE: 127 MMHG | HEART RATE: 105 BPM | WEIGHT: 204 LBS | OXYGEN SATURATION: 95 % | TEMPERATURE: 97 F

## 2023-06-28 DIAGNOSIS — Z98.890 S/P ENDOMETRIAL ABLATION: ICD-10-CM

## 2023-06-28 DIAGNOSIS — Z98.51 S/P TUBAL LIGATION: Primary | ICD-10-CM

## 2023-06-28 DIAGNOSIS — Z30.09 STERILIZATION EDUCATION: ICD-10-CM

## 2023-06-28 DIAGNOSIS — N93.9 ABNORMAL UTERINE BLEEDING: ICD-10-CM

## 2023-06-28 DIAGNOSIS — Z98.890 S/P D&C (STATUS POST DILATION AND CURETTAGE): ICD-10-CM

## 2023-06-28 PROCEDURE — 25000003 PHARM REV CODE 250: Performed by: OBSTETRICS & GYNECOLOGY

## 2023-06-28 PROCEDURE — 37000008 HC ANESTHESIA 1ST 15 MINUTES: Performed by: OBSTETRICS & GYNECOLOGY

## 2023-06-28 PROCEDURE — 25000003 PHARM REV CODE 250: Performed by: NURSE ANESTHETIST, CERTIFIED REGISTERED

## 2023-06-28 PROCEDURE — 37000009 HC ANESTHESIA EA ADD 15 MINS: Performed by: OBSTETRICS & GYNECOLOGY

## 2023-06-28 PROCEDURE — 58670 LAPAROSCOPY TUBAL CAUTERY: CPT | Mod: 51,,, | Performed by: OBSTETRICS & GYNECOLOGY

## 2023-06-28 PROCEDURE — 71000033 HC RECOVERY, INTIAL HOUR: Performed by: OBSTETRICS & GYNECOLOGY

## 2023-06-28 PROCEDURE — 58353 ENDOMETR ABLATE THERMAL: CPT | Mod: ,,, | Performed by: OBSTETRICS & GYNECOLOGY

## 2023-06-28 PROCEDURE — 63600175 PHARM REV CODE 636 W HCPCS

## 2023-06-28 PROCEDURE — D9220A PRA ANESTHESIA: ICD-10-PCS | Mod: ,,, | Performed by: NURSE ANESTHETIST, CERTIFIED REGISTERED

## 2023-06-28 PROCEDURE — D9220A PRA ANESTHESIA: Mod: ,,, | Performed by: NURSE ANESTHETIST, CERTIFIED REGISTERED

## 2023-06-28 PROCEDURE — 00940 ANES VAGINAL PX NOS: CPT | Performed by: OBSTETRICS & GYNECOLOGY

## 2023-06-28 PROCEDURE — 27201423 OPTIME MED/SURG SUP & DEVICES STERILE SUPPLY: Performed by: OBSTETRICS & GYNECOLOGY

## 2023-06-28 PROCEDURE — 58670 PR LAP,TUBAL CAUTERY: ICD-10-PCS | Mod: 51,,, | Performed by: OBSTETRICS & GYNECOLOGY

## 2023-06-28 PROCEDURE — 71000015 HC POSTOP RECOV 1ST HR: Performed by: OBSTETRICS & GYNECOLOGY

## 2023-06-28 PROCEDURE — 63600175 PHARM REV CODE 636 W HCPCS: Performed by: NURSE ANESTHETIST, CERTIFIED REGISTERED

## 2023-06-28 PROCEDURE — 58353 PR ENDOMETRIAL ABLATION, THERMAL: ICD-10-PCS | Mod: ,,, | Performed by: OBSTETRICS & GYNECOLOGY

## 2023-06-28 PROCEDURE — 36000709 HC OR TIME LEV III EA ADD 15 MIN: Performed by: OBSTETRICS & GYNECOLOGY

## 2023-06-28 PROCEDURE — 63600175 PHARM REV CODE 636 W HCPCS: Performed by: OBSTETRICS & GYNECOLOGY

## 2023-06-28 PROCEDURE — 36000708 HC OR TIME LEV III 1ST 15 MIN: Performed by: OBSTETRICS & GYNECOLOGY

## 2023-06-28 RX ORDER — VECURONIUM BROMIDE FOR INJECTION 1 MG/ML
INJECTION, POWDER, LYOPHILIZED, FOR SOLUTION INTRAVENOUS
Status: DISCONTINUED | OUTPATIENT
Start: 2023-06-28 | End: 2023-06-28

## 2023-06-28 RX ORDER — FAMOTIDINE 20 MG/1
20 TABLET, FILM COATED ORAL
Status: DISCONTINUED | OUTPATIENT
Start: 2023-06-28 | End: 2023-06-28 | Stop reason: HOSPADM

## 2023-06-28 RX ORDER — TRIPROLIDINE/PSEUDOEPHEDRINE 2.5MG-60MG
30 TABLET ORAL EVERY 6 HOURS PRN
Qty: 480 ML | Refills: 2 | Status: SHIPPED | OUTPATIENT
Start: 2023-06-28

## 2023-06-28 RX ORDER — LIDOCAINE HYDROCHLORIDE 20 MG/ML
INJECTION, SOLUTION EPIDURAL; INFILTRATION; INTRACAUDAL; PERINEURAL
Status: DISCONTINUED | OUTPATIENT
Start: 2023-06-28 | End: 2023-06-28

## 2023-06-28 RX ORDER — DEXAMETHASONE SODIUM PHOSPHATE 4 MG/ML
INJECTION, SOLUTION INTRA-ARTICULAR; INTRALESIONAL; INTRAMUSCULAR; INTRAVENOUS; SOFT TISSUE
Status: DISCONTINUED | OUTPATIENT
Start: 2023-06-28 | End: 2023-06-28

## 2023-06-28 RX ORDER — ONDANSETRON 4 MG/1
8 TABLET, ORALLY DISINTEGRATING ORAL EVERY 8 HOURS PRN
Status: DISCONTINUED | OUTPATIENT
Start: 2023-06-28 | End: 2023-06-28 | Stop reason: HOSPADM

## 2023-06-28 RX ORDER — PROPOFOL 10 MG/ML
VIAL (ML) INTRAVENOUS
Status: DISCONTINUED | OUTPATIENT
Start: 2023-06-28 | End: 2023-06-28

## 2023-06-28 RX ORDER — FENTANYL CITRATE 50 UG/ML
INJECTION, SOLUTION INTRAMUSCULAR; INTRAVENOUS
Status: DISCONTINUED | OUTPATIENT
Start: 2023-06-28 | End: 2023-06-28

## 2023-06-28 RX ORDER — CEFAZOLIN SODIUM 1 G/3ML
INJECTION, POWDER, FOR SOLUTION INTRAMUSCULAR; INTRAVENOUS
Status: DISCONTINUED | OUTPATIENT
Start: 2023-06-28 | End: 2023-06-28

## 2023-06-28 RX ORDER — FAMOTIDINE 20 MG/1
20 TABLET, FILM COATED ORAL
Status: COMPLETED | OUTPATIENT
Start: 2023-06-28 | End: 2023-06-28

## 2023-06-28 RX ORDER — MIDAZOLAM HYDROCHLORIDE 1 MG/ML
2 INJECTION INTRAMUSCULAR; INTRAVENOUS
Status: COMPLETED | OUTPATIENT
Start: 2023-06-28 | End: 2023-06-28

## 2023-06-28 RX ORDER — DIPHENHYDRAMINE HYDROCHLORIDE 50 MG/ML
25 INJECTION INTRAMUSCULAR; INTRAVENOUS EVERY 4 HOURS PRN
Status: DISCONTINUED | OUTPATIENT
Start: 2023-06-28 | End: 2023-06-28 | Stop reason: HOSPADM

## 2023-06-28 RX ORDER — ONDANSETRON 2 MG/ML
INJECTION INTRAMUSCULAR; INTRAVENOUS
Status: DISCONTINUED | OUTPATIENT
Start: 2023-06-28 | End: 2023-06-28

## 2023-06-28 RX ORDER — MUPIROCIN 20 MG/G
OINTMENT TOPICAL
Status: DISCONTINUED | OUTPATIENT
Start: 2023-06-28 | End: 2023-06-28

## 2023-06-28 RX ORDER — SODIUM CHLORIDE 9 MG/ML
INJECTION, SOLUTION INTRAVENOUS CONTINUOUS
Status: DISCONTINUED | OUTPATIENT
Start: 2023-06-28 | End: 2023-06-28

## 2023-06-28 RX ORDER — PROCHLORPERAZINE EDISYLATE 5 MG/ML
5 INJECTION INTRAMUSCULAR; INTRAVENOUS EVERY 6 HOURS PRN
Status: DISCONTINUED | OUTPATIENT
Start: 2023-06-28 | End: 2023-06-28 | Stop reason: HOSPADM

## 2023-06-28 RX ORDER — KETOROLAC TROMETHAMINE 30 MG/ML
INJECTION, SOLUTION INTRAMUSCULAR; INTRAVENOUS
Status: DISCONTINUED | OUTPATIENT
Start: 2023-06-28 | End: 2023-06-28

## 2023-06-28 RX ORDER — BUPIVACAINE HYDROCHLORIDE 5 MG/ML
INJECTION, SOLUTION EPIDURAL; INTRACAUDAL
Status: DISCONTINUED | OUTPATIENT
Start: 2023-06-28 | End: 2023-06-28 | Stop reason: HOSPADM

## 2023-06-28 RX ORDER — HYDROCODONE BITARTRATE AND ACETAMINOPHEN 5; 325 MG/1; MG/1
1 TABLET ORAL EVERY 6 HOURS PRN
Qty: 8 TABLET | Refills: 0 | Status: SHIPPED | OUTPATIENT
Start: 2023-06-28

## 2023-06-28 RX ORDER — HYDROCODONE BITARTRATE AND ACETAMINOPHEN 5; 325 MG/1; MG/1
1 TABLET ORAL EVERY 4 HOURS PRN
Status: DISCONTINUED | OUTPATIENT
Start: 2023-06-28 | End: 2023-06-28 | Stop reason: HOSPADM

## 2023-06-28 RX ORDER — SODIUM CHLORIDE, SODIUM LACTATE, POTASSIUM CHLORIDE, CALCIUM CHLORIDE 600; 310; 30; 20 MG/100ML; MG/100ML; MG/100ML; MG/100ML
INJECTION, SOLUTION INTRAVENOUS CONTINUOUS
Status: DISCONTINUED | OUTPATIENT
Start: 2023-06-28 | End: 2023-06-28 | Stop reason: HOSPADM

## 2023-06-28 RX ORDER — OXYCODONE AND ACETAMINOPHEN 10; 325 MG/1; MG/1
1 TABLET ORAL EVERY 4 HOURS PRN
Status: DISCONTINUED | OUTPATIENT
Start: 2023-06-28 | End: 2023-06-28 | Stop reason: HOSPADM

## 2023-06-28 RX ORDER — DIPHENHYDRAMINE HCL 25 MG
25 CAPSULE ORAL EVERY 4 HOURS PRN
Status: DISCONTINUED | OUTPATIENT
Start: 2023-06-28 | End: 2023-06-28 | Stop reason: HOSPADM

## 2023-06-28 RX ORDER — IBUPROFEN 600 MG/1
600 TABLET ORAL EVERY 6 HOURS PRN
Status: DISCONTINUED | OUTPATIENT
Start: 2023-06-28 | End: 2023-06-28 | Stop reason: HOSPADM

## 2023-06-28 RX ORDER — HYDROMORPHONE HYDROCHLORIDE 1 MG/ML
1 INJECTION, SOLUTION INTRAMUSCULAR; INTRAVENOUS; SUBCUTANEOUS EVERY 6 HOURS PRN
Status: DISCONTINUED | OUTPATIENT
Start: 2023-06-28 | End: 2023-06-28 | Stop reason: HOSPADM

## 2023-06-28 RX ADMIN — KETOROLAC TROMETHAMINE 30 MG: 30 INJECTION, SOLUTION INTRAMUSCULAR; INTRAVENOUS at 07:06

## 2023-06-28 RX ADMIN — VECURONIUM BROMIDE 4 MG: 10 INJECTION, POWDER, FOR SOLUTION INTRAVENOUS at 06:06

## 2023-06-28 RX ADMIN — SODIUM CHLORIDE, POTASSIUM CHLORIDE, SODIUM LACTATE AND CALCIUM CHLORIDE: 600; 310; 30; 20 INJECTION, SOLUTION INTRAVENOUS at 06:06

## 2023-06-28 RX ADMIN — DEXAMETHASONE SODIUM PHOSPHATE 4 MG: 4 INJECTION, SOLUTION INTRA-ARTICULAR; INTRALESIONAL; INTRAMUSCULAR; INTRAVENOUS; SOFT TISSUE at 06:06

## 2023-06-28 RX ADMIN — HYDROMORPHONE HYDROCHLORIDE 1 MG: 1 INJECTION, SOLUTION INTRAMUSCULAR; INTRAVENOUS; SUBCUTANEOUS at 08:06

## 2023-06-28 RX ADMIN — FENTANYL CITRATE 100 MCG: 50 INJECTION, SOLUTION INTRAMUSCULAR; INTRAVENOUS at 06:06

## 2023-06-28 RX ADMIN — GLYCOPYRROLATE 0.2 MG: 0.2 INJECTION, SOLUTION INTRAMUSCULAR; INTRAVITREAL at 05:06

## 2023-06-28 RX ADMIN — SUGAMMADEX 200 MG: 100 INJECTION, SOLUTION INTRAVENOUS at 07:06

## 2023-06-28 RX ADMIN — FAMOTIDINE 20 MG: 20 TABLET, FILM COATED ORAL at 05:06

## 2023-06-28 RX ADMIN — LIDOCAINE HYDROCHLORIDE 5 ML: 20 INJECTION, SOLUTION EPIDURAL; INFILTRATION; INTRACAUDAL; PERINEURAL at 06:06

## 2023-06-28 RX ADMIN — MIDAZOLAM HYDROCHLORIDE 2 MG: 1 INJECTION, SOLUTION INTRAMUSCULAR; INTRAVENOUS at 06:06

## 2023-06-28 RX ADMIN — PROPOFOL 150 MG: 10 INJECTION, EMULSION INTRAVENOUS at 06:06

## 2023-06-28 RX ADMIN — ONDANSETRON 4 MG: 2 INJECTION INTRAMUSCULAR; INTRAVENOUS at 06:06

## 2023-06-28 RX ADMIN — SODIUM CHLORIDE: 9 INJECTION, SOLUTION INTRAVENOUS at 05:06

## 2023-06-28 RX ADMIN — CEFAZOLIN 2 G: 1 INJECTION, POWDER, FOR SOLUTION INTRAMUSCULAR; INTRAVENOUS at 06:06

## 2023-06-28 NOTE — OP NOTE
DATE OF PROCEDURE: 6/28/2023    PRE-OP DIAGNOSIS:  Sterilization education [Z30.09]  Abnormal uterine bleeding [N93.9]    POST-OP DIAGNOSIS:  Post-Op Diagnosis Codes:     * Sterilization education [Z30.09]     * Abnormal uterine bleeding [N93.9]    Procedure:   Laparoscopic Tubal Cauterization  Hysteroscope Dilation and Curettage  NovaSure Endometrial Ablation    Surgeon: Rashel Su MD    Estimated Blood Loss:  0 cc    Findings:   Normal vagina.  Cervix normal.  Uterus sounded to 10 cm.  Long cervical canal with relatively short endometrial canal of 4 cm.  Normal appearing endometrium noted.     Procedure:   After consent were reviewed, the patient was taken to the operative room and placed on the OR table.  A time out  was held and after general anesthesia was induced.    The patient was placed in the dorsal lithotomy position and prepped and draped in the standard sterile fashion.      A moist sponge stick was placed in the vagina for uterine manipulation.  Attention was turned to the abdomen.  A 1 cm incision was made below the umbilicus.  Fascia was tented up with towel clips, and Veress needle was inserted.  Abdomen was insufflated to 15 mmHg.  A 5 mm port was placed.  Camera was inserted.  Patient was placed in Trendelenburg position normal uterus tubes and ovaries were noted.  A 5 mm port was then attempted supra pubically, but the fascial scar could not be overcome with available introducer.  The 5 mm port was then placed in the LLQ.  Kleppingers were inserted.  The uterus was mobilized and the fallopian tubes were identified.  Normal pelvic anatomy was noted.  A 3 cm portion of the isthmic Fallopian tube was then cauterized on the left side.  This was repeated for the right Fallopian tube.  Documentation was obtained.  The abdomen and pelvis were inspected; there was no evidence of surgical injury.  The scope and operative device were removed.  Gas was allowed to expel from the patient's abdomen.  Ports  were removed.  The fascia was closed with a 0 Vicryl suture.  Skin was closed with 4-0 Monocryl.  Dermabond was applied.      A weighted speculum was placed in the posterior vagina.  The cervix was identified and grasped with a single tooth tenaculum.  The uterus was sounded to 10 cm.  The cervix was then dilated to allow passage of a rigid hysteroscope.  The scope was inserted.  The above findings were noted. The scope was removed.  The endometrium was curetted until gritty texture was noted throughput the cavity. Specimen was handed off.  An endocervical curetting was performed and handed off separately.    NovaSure device was opened and placed for  recommendations.  Endometrial cavity length was measured at 4 cm.  Cavity width measured 3.1 cm.  Cavity assessment was performed; cavity was intact.  Power setting was 68 W.  Burn was initiated total burn time was 1 minutes.  After completion of the ablation a hysteroscope was reinserted; complete ablation of all endometrial tissue was appreciated.    At that point, the procedure was terminated.  All instruments were removed.  Counts were correct.  Patient was awakened and taken to the recovery room in stable condition having tolerated the procedure well.

## 2023-06-28 NOTE — BRIEF OP NOTE
[facility]  [facility]rief [facility]    Surgery Date: 6/28/2023     Surgeon(s) and Role:     * Rashel Su MD - Primary    Assisting Surgeon: None    Pre-op Diagnosis:  Sterilization education [Z30.09]  Abnormal uterine bleeding [N93.9]    Post-op Diagnosis:  Post-Op Diagnosis Codes:     * Sterilization education [Z30.09]     * Abnormal uterine bleeding [N93.9]    Procedure(s) (LRB):  LIGATION, FALLOPIAN TUBE, LAPAROSCOPIC (Bilateral)  HYSTEROSCOPY, WITH DILATION AND CURETTAGE OF UTERUS AND HYDROTHERMAL ENDOMETRIAL ABLATION (N/A)    Anesthesia: General    Operative Findings: see op note    Estimated Blood Loss: * No values recorded between 6/28/2023  7:14 AM and 6/28/2023  7:46 AM *         Specimens:   Specimen (24h ago, onward)       Start     Ordered    06/28/23 0741  Specimen to Pathology Gynecology and Obstetrics  Once        References:    Click here for ordering Quick Tip   Question Answer Comment   Procedure Type: Gynecology and Obstetrics    Specimen Source Endometrium ENDOMETRIAL CURRETTINGS   Clinical Information: LAP TUBAL,HYSTEROSCOPY D&C,ABLATION    Release to patient Immediate        06/28/23 0740    06/28/23 0737  Specimen to Pathology  RELEASE UPON ORDERING        References:    Click here for ordering Quick Tip   Question:  Release to patient  Answer:  Immediate    06/28/23 0737                      Discharge Note    OUTCOME: Patient tolerated treatment/procedure well without complication and is now ready for discharge.    DISPOSITION: Home or Self Care    FINAL DIAGNOSIS:  menorrhagia    FOLLOWUP: In clinic    DISCHARGE INSTRUCTIONS:  No discharge procedures on file.

## 2023-06-28 NOTE — DISCHARGE INSTRUCTIONS
Exofin  PATIENT AFTER-CARE INSTRUCTIONS          Your wound has been repaired using Exofin® High Viscosity topical tissue adhesive. The list below is a guide for you to understand and care for your wound following your procedure.          1. Keep the wound dry.     You may occasionally and briefly wet your wound in the shower or bath at the direction of your physician, but do not soak or scrub the wound area. After showering or bathing, gently blot your wound dry with a soft towel.       2. Avoid Topical Medications     Do not apply liquid or ointment medications, lotions, creams, petroleum jelly, mineral oils or any other product to your wound while the Exofin® adhesive film is in place.         3. Do not rub, scratch, or pick at the wound.     Doing so may compromise the integrity of the wound closure and cause scaring.        4. Protect the wound from prolonged sunlight exposure.     Do not use tanning lamps while the film is in place.        5. Check wound appearance.      Some swelling, redness, and pain are common with all wounds and normally will go away as the wound heals. If swelling, redness, or pain increases, or if the wound feels warm to the touch, contact your doctor. Also contact your doctor if the wound edges reopen or separate.        6. Exofin® will naturally slough off between 5 and 10 days after the procedure.     By this time, your wound should be sufficiently healed. This information is not intended as a substitute for the advice of a physician. For more detailed information, talk to your doctor. Your doctor can choose the best treatment for you in your particular circumstances.             DECREASED ACTIVITY TODAY, NO HEAVY LIFTING OR STRAINING, NO PUSHING OR PULLING, DON'T OPERATE MACHINERY/VEHICLE IN 24 HOURS, NO DRIVING TODAY OR WHILE TAKING PAIN MEDS, NO INTERCOURSE, DOUCHING, OR TAMPONS, SHOWERS ONLY, NO TUB BATHS, DECREASED ACTIVITY UNTIL AFTER APPOINTMENT   For additional questions and  concerns, please consult your doctor.

## 2023-06-28 NOTE — ANESTHESIA PROCEDURE NOTES
Intubation    Date/Time: 6/28/2023 6:47 AM  Performed by: Eric Antonio CRNA  Authorized by: Eric Antonio CRNA     Intubation:     Induction:  Intravenous    Intubated:  Postinduction    Mask Ventilation:  Easy with oral airway    Attempts:  1    Attempted By:  CRNA    Method of Intubation:  Direct    Blade:  Nuñez 2    Laryngeal View Grade: Grade IIA - cords partially seen      Difficult Airway Encountered?: No      Complications:  None    Airway Device:  Oral endotracheal tube    Airway Device Size:  7.0    Style/Cuff Inflation:  Cuffed (inflated to minimal occlusive pressure)    Inflation Amount (mL):  6    Tube secured:  21    Secured at:  The lips    Placement Verified By:  Capnometry    Complicating Factors:  None    Findings Post-Intubation:  BS equal bilateral

## 2023-06-28 NOTE — TRANSFER OF CARE
"Anesthesia Transfer of Care Note    Patient: Carrie Lejeune    Procedure(s) Performed: Procedure(s) (LRB):  LIGATION, FALLOPIAN TUBE, LAPAROSCOPIC (Bilateral)  HYSTEROSCOPY, WITH DILATION AND CURETTAGE OF UTERUS AND HYDROTHERMAL ENDOMETRIAL ABLATION (N/A)    Patient location: PACU    Anesthesia Type: general    Transport from OR: Transported from OR on room air with adequate spontaneous ventilation    Post pain: adequate analgesia    Post assessment: no apparent anesthetic complications    Post vital signs: stable    Level of consciousness: responds to stimulation    Nausea/Vomiting: no nausea/vomiting    Complications: none    Transfer of care protocol was followed      Last vitals:   Visit Vitals  /74 (BP Location: Left arm, Patient Position: Lying)   Pulse 94   Temp 35.8 °C (96.4 °F) (Temporal)   Resp 18   Ht 5' 3" (1.6 m)   Wt 92.5 kg (204 lb)   LMP 03/15/2023 (Approximate)   SpO2 97%   Breastfeeding No   BMI 36.14 kg/m²     "

## 2023-06-28 NOTE — PLAN OF CARE
NOVASURE ABLATION:LENGTH:4.0 CM, WIDTH:3.1 CM, POWER:68 CHAU, TIME:1 MINUTE  
PATIENT STABLE AND RESTING COMFORTABLY. PATIENT MEETS CRITERIA FOR TRANSFER.   
PT DRESSED AND READY FOR D/C; PT WAS GIVEN INSTRUCTIONS AND VERBALIZED UNDERSTANDING  
PT IN ROOM AFTER PROCEDURE, AAOx3, FAMILY AT SIDE; INCISION x3 TO LOWER ABD WITH EXOFIN DRY AND INTACT, VAG PAD DRY AND INTACT; PT WAS GIVEN ICE CHIPS AND TOLERATED WITHOUT NAUSEA; IN STABLE CONDITION WITHOUT C/O  
PT PRESCRIPTIONS PICKED UP AT PHARMACY; PT SITTING UP IN BED EATING ICE CHIPS AND TOLERATES WITHOUT NAUSEA; IN STABLE CONDITION WITHOUT C/O  
PT UP TO BATHROOM AND VOIDED CLEAR YELLOW URINE; PT FAMILY AT SIDE TO ASSIST; IN STABLE CONDITION WITHOUT C/O  
(3) 3 to less than 7 years old

## 2023-06-28 NOTE — ANESTHESIA POSTPROCEDURE EVALUATION
Anesthesia Post Evaluation    Patient: Carrie Lejeune    Procedure(s) Performed: Procedure(s) (LRB):  LIGATION, FALLOPIAN TUBE, LAPAROSCOPIC (Bilateral)  HYSTEROSCOPY, WITH DILATION AND CURETTAGE OF UTERUS AND HYDROTHERMAL ENDOMETRIAL ABLATION (N/A)    Final Anesthesia Type: general      Patient location during evaluation: PACU  Patient participation: Yes- Able to Participate  Level of consciousness: awake and alert and oriented  Post-procedure vital signs: reviewed and stable  Pain management: adequate  Airway patency: patent  SPRING mitigation strategies: Verification of full reversal of neuromuscular block  PONV status at discharge: No PONV  Anesthetic complications: no      Cardiovascular status: blood pressure returned to baseline and stable  Respiratory status: spontaneous ventilation and unassisted  Hydration status: euvolemic  Follow-up not needed.  Comments: [facility]          Vitals Value Taken Time   /74 06/28/23 0555   Temp 35.8 °C (96.4 °F) 06/28/23 0555   Pulse 94 06/28/23 0555   Resp 18 06/28/23 0555   SpO2 97 % 06/28/23 0555         No case tracking events are documented in the log.      Pain/Nikhil Score: No data recorded

## 2023-07-03 NOTE — PROGRESS NOTES
Chief Complaint     Post-op Evaluation (S/p LTC, ablation and Hyst D&C. )    HPI:     Patient is a 38 y.o.  s/p tubal ligation with endometrial ablation on 23 here for follow up.     Pathology:  Endometrial curettings:  Disordered proliferative endometrium     LMP: 23  Frequency: irregular, previous cycle 10/2022   Cycle Length: 5 days   Flow: heavy  Intermenstrual Bleeding: No  Postcoital Bleeding: No  Dysmenorrhea: Yes, Moderate  Sexually Active: Yes   Dyspareunia: No  Contraception: None   H/o STI: +Hx of HPV  Last pap: 23- Negative w/ +High    Past Medical History:   Diagnosis Date    Anemia     Anxiety disorder, unspecified     BMI 34.0-34.9,adult     BMI 35.0-35.9,adult     Depression     DM (diabetes mellitus), gestational     Eczema     Elevated fasting blood sugar     Headache     Hereditary spherocytosis     Hyperbilirubinemia     Leukocytosis     Neuropathy     Personal history of COVID-19     Scleral icterus     Screening for cholesterol level     Screening for diabetes mellitus (DM)     Screening for thyroid disorder     Spherocytosis        Past Surgical History:   Procedure Laterality Date     SECTION  2004     SECTION  2003     SECTION  10/04/2016    CHOLECYSTECTOMY  2006    ENDOMETRIAL BIOPSY  2023    HYSTEROSCOPY WITH HYDROTHERMAL ABLATION OF ENDOMETRIUM WITH DILATION AND CURETTAGE N/A 2023    Procedure: HYSTEROSCOPY, WITH DILATION AND CURETTAGE OF UTERUS AND HYDROTHERMAL ENDOMETRIAL ABLATION;  Surgeon: Rashel Su MD;  Location: [facility] OR;  Service: OB/GYN;  Laterality: N/A;    LAPAROSCOPIC LIGATION OF FALLOPIAN TUBE Bilateral 2023    Procedure: LIGATION, FALLOPIAN TUBE, LAPAROSCOPIC;  Surgeon: aRshel Su MD;  Location: [facility] OR;  Service: OB/GYN;  Laterality: Bilateral;    RIGHT OOPHORECTOMY Right 2017    TONSILLECTOMY         Family History   Problem Relation Age of Onset    No Known Problems Father     No  Known Problems Mother     No Known Problems Brother     No Known Problems Sister     Breast cancer Neg Hx     Colon cancer Neg Hx     Ovarian cancer Neg Hx     Cervical cancer Neg Hx     Uterine cancer Neg Hx        OB History          3    Para   3    Term   2       1    AB        Living   2         SAB        IAB        Ectopic        Multiple        Live Births   2                 Current Outpatient Medications on File Prior to Visit   Medication Sig Dispense Refill    albuterol (PROVENTIL/VENTOLIN HFA) 90 mcg/actuation inhaler Inhale 2 puffs into the lungs every 4 (four) hours as needed for Wheezing. Rescue      crisaborole (EUCRISA) 2 % Oint Apply topically 2 (two) times daily.      DULoxetine (CYMBALTA) 60 MG capsule Take 2 capsules (120 mg total) by mouth once daily. 60 capsule 11    gabapentin (NEURONTIN) 600 MG tablet Take 1,200 mg by mouth 2 (two) times daily.      HYDROcodone-acetaminophen (NORCO) 5-325 mg per tablet Take 1 tablet by mouth every 6 (six) hours as needed for Pain. 8 tablet 0    ibuprofen 20 mg/mL oral liquid Take 30 mLs (600 mg total) by mouth every 6 (six) hours as needed for Pain. 480 mL 2    pregabalin (LYRICA) 100 MG capsule Take 100 mg by mouth 3 (three) times daily.       No current facility-administered medications on file prior to visit.       Review of Systems:       Review of Systems   Constitutional:  Negative for chills and fever.   Gastrointestinal:  Negative for abdominal pain, constipation and diarrhea.   Genitourinary:  Negative for bladder incontinence, decreased libido, dysmenorrhea, dyspareunia, dysuria, flank pain, frequency, genital sores, hematuria, hot flashes, menorrhagia, menstrual problem, pelvic pain, urgency, vaginal bleeding, vaginal discharge, vaginal pain, urinary incontinence, postcoital bleeding, postmenopausal bleeding, vaginal dryness and vaginal odor.      Physical Exam:    /84 (BP Location: Right arm)   LMP 03/15/2023 (Approximate)      Physical Exam   Gen :NAD  Abd: Soft, NT. Wounds healing well. There is mild erythema of the umbilical wound and it is mildy tender to touch.   Assessment:   1. S/P endometrial ablation    2. S/P tubal ligation             Plan:   Rx: Bactrim  May return to normal activity  Discussed pathology  RTC prn/annual

## 2023-07-12 ENCOUNTER — OFFICE VISIT (OUTPATIENT)
Dept: OBSTETRICS AND GYNECOLOGY | Facility: CLINIC | Age: 38
End: 2023-07-12
Payer: MEDICAID

## 2023-07-12 VITALS — SYSTOLIC BLOOD PRESSURE: 130 MMHG | DIASTOLIC BLOOD PRESSURE: 84 MMHG

## 2023-07-12 DIAGNOSIS — Z98.890 S/P ENDOMETRIAL ABLATION: Primary | ICD-10-CM

## 2023-07-12 DIAGNOSIS — Z98.51 S/P TUBAL LIGATION: ICD-10-CM

## 2023-07-12 PROCEDURE — 3044F PR MOST RECENT HEMOGLOBIN A1C LEVEL <7.0%: ICD-10-PCS | Mod: CPTII,,, | Performed by: OBSTETRICS & GYNECOLOGY

## 2023-07-12 PROCEDURE — 3079F PR MOST RECENT DIASTOLIC BLOOD PRESSURE 80-89 MM HG: ICD-10-PCS | Mod: CPTII,,, | Performed by: OBSTETRICS & GYNECOLOGY

## 2023-07-12 PROCEDURE — 99024 PR POST-OP FOLLOW-UP VISIT: ICD-10-PCS | Mod: ,,, | Performed by: OBSTETRICS & GYNECOLOGY

## 2023-07-12 PROCEDURE — 3075F PR MOST RECENT SYSTOLIC BLOOD PRESS GE 130-139MM HG: ICD-10-PCS | Mod: CPTII,,, | Performed by: OBSTETRICS & GYNECOLOGY

## 2023-07-12 PROCEDURE — 99024 POSTOP FOLLOW-UP VISIT: CPT | Mod: ,,, | Performed by: OBSTETRICS & GYNECOLOGY

## 2023-07-12 PROCEDURE — 1159F MED LIST DOCD IN RCRD: CPT | Mod: CPTII,,, | Performed by: OBSTETRICS & GYNECOLOGY

## 2023-07-12 PROCEDURE — 3044F HG A1C LEVEL LT 7.0%: CPT | Mod: CPTII,,, | Performed by: OBSTETRICS & GYNECOLOGY

## 2023-07-12 PROCEDURE — 3079F DIAST BP 80-89 MM HG: CPT | Mod: CPTII,,, | Performed by: OBSTETRICS & GYNECOLOGY

## 2023-07-12 PROCEDURE — 1159F PR MEDICATION LIST DOCUMENTED IN MEDICAL RECORD: ICD-10-PCS | Mod: CPTII,,, | Performed by: OBSTETRICS & GYNECOLOGY

## 2023-07-12 PROCEDURE — 3075F SYST BP GE 130 - 139MM HG: CPT | Mod: CPTII,,, | Performed by: OBSTETRICS & GYNECOLOGY

## 2023-07-12 RX ORDER — SULFAMETHOXAZOLE AND TRIMETHOPRIM 800; 160 MG/1; MG/1
1 TABLET ORAL 2 TIMES DAILY
Qty: 14 TABLET | Refills: 0 | Status: SHIPPED | OUTPATIENT
Start: 2023-07-12 | End: 2023-07-19

## (undated) DEVICE — BLADE SURG CARBON STEEL SZ11

## (undated) DEVICE — PACK BASIC

## (undated) DEVICE — Device

## (undated) DEVICE — KIT MAJOR SINGLE BASIN

## (undated) DEVICE — TROCAR ENDO Z THREAD KII 5X100

## (undated) DEVICE — TUBE AQUILEX OUTFLOW

## (undated) DEVICE — DRAPE LEGGINGS CUFF 33X51IN

## (undated) DEVICE — DRAPE LEGGINGS CUFF 31X48IN

## (undated) DEVICE — GLOVE PROTEXIS PI SYN SURG 8.5

## (undated) DEVICE — GOWN POLY REINF BRTH SLV 2XL

## (undated) DEVICE — SOL IRR NACL .9% 3000ML

## (undated) DEVICE — TRAY CATH URETHRAL FOLEY 16FR

## (undated) DEVICE — DEVICE ABLATION NOVASURE DISP

## (undated) DEVICE — DRAPE UNDER BUTTOCKS SUC PORT

## (undated) DEVICE — DRESSING TELFA N ADH 3X8

## (undated) DEVICE — DRAPE HALF SURGICAL 40X58IN

## (undated) DEVICE — TRAY DRY SKIN SCRUB PREP

## (undated) DEVICE — NDL INSUFFLATION VERRES 120MM

## (undated) DEVICE — SYS EXOFIN SKIN CLOSURE 22CM

## (undated) DEVICE — SUT 0 VICRYL / UR6 (J603)

## (undated) DEVICE — CATH 16FR URETHRL RED RUB

## (undated) DEVICE — CHLORAPREP W TINT 26ML APPL

## (undated) DEVICE — DRAPE UND BUTT W/POUCH 40X44IN

## (undated) DEVICE — TROCAR 5X100MM BLADED Z THREAD

## (undated) DEVICE — TUBE AQUILEX INFLOW

## (undated) DEVICE — PAD PINK TRENDELENBURG POS XL

## (undated) DEVICE — SUT MONO 1 CTX VIL MONO

## (undated) DEVICE — FORCEP BPLR ENDOSCOPIC 310MM

## (undated) DEVICE — SEAL LENS SCOPE MYOSURE